# Patient Record
Sex: FEMALE | Race: WHITE | NOT HISPANIC OR LATINO | Employment: OTHER | URBAN - METROPOLITAN AREA
[De-identification: names, ages, dates, MRNs, and addresses within clinical notes are randomized per-mention and may not be internally consistent; named-entity substitution may affect disease eponyms.]

---

## 2017-01-04 ENCOUNTER — OFFICE VISIT (OUTPATIENT)
Dept: INFECTIOUS DISEASES | Facility: CLINIC | Age: 67
End: 2017-01-04
Payer: MEDICARE

## 2017-01-04 ENCOUNTER — HOSPITAL ENCOUNTER (OUTPATIENT)
Dept: RADIOLOGY | Facility: HOSPITAL | Age: 67
Discharge: HOME | End: 2017-01-04
Attending: INTERNAL MEDICINE
Payer: MEDICARE

## 2017-01-04 VITALS
HEART RATE: 76 BPM | SYSTOLIC BLOOD PRESSURE: 114 MMHG | TEMPERATURE: 99 F | DIASTOLIC BLOOD PRESSURE: 68 MMHG | BODY MASS INDEX: 21.51 KG/M2 | HEIGHT: 60 IN | WEIGHT: 109.56 LBS

## 2017-01-04 DIAGNOSIS — Z22.7 LATENT TUBERCULOSIS BY BLOOD TEST: ICD-10-CM

## 2017-01-04 DIAGNOSIS — Z22.7 LATENT TUBERCULOSIS BY BLOOD TEST: Primary | ICD-10-CM

## 2017-01-04 PROCEDURE — 99999 PR PBB SHADOW E&M-EST. PATIENT-LVL III: CPT | Mod: PBBFAC,,, | Performed by: INTERNAL MEDICINE

## 2017-01-04 PROCEDURE — 99203 OFFICE O/P NEW LOW 30 MIN: CPT | Mod: S$PBB,,, | Performed by: INTERNAL MEDICINE

## 2017-01-04 PROCEDURE — 71020 XR CHEST PA AND LATERAL: CPT | Mod: TC

## 2017-01-04 PROCEDURE — 71020 XR CHEST PA AND LATERAL: CPT | Mod: 26,,, | Performed by: RADIOLOGY

## 2017-01-04 RX ORDER — LANOLIN ALCOHOL/MO/W.PET/CERES
50 CREAM (GRAM) TOPICAL DAILY
Qty: 90 TABLET | Refills: 3 | Status: SHIPPED | OUTPATIENT
Start: 2017-01-04 | End: 2018-01-04

## 2017-01-04 RX ORDER — ISONIAZID 300 MG/1
300 TABLET ORAL DAILY
Qty: 90 TABLET | Refills: 3 | Status: SHIPPED | OUTPATIENT
Start: 2017-01-04 | End: 2017-12-26 | Stop reason: SDUPTHER

## 2017-01-04 NOTE — PROGRESS NOTES
Subjective:      Patient ID: Joceline Holcomb is a 66 y.o. female.    Chief Complaint:Positive Pablo. Gold test      History of Present Illness    Pt with RA sent by Dr. Barrett for recommendations. She has no hx of TB risk or exposure known. Found to have a positive quantiferon test on 12/21/16.   No cough, fever, night sweats or weight loss.   No recent CXR.    Review of Systems   Constitution: Positive for weakness, malaise/fatigue and weight loss. Negative for chills, decreased appetite, fever, night sweats and weight gain.   HENT: Positive for headaches and tinnitus. Negative for congestion, ear pain, hearing loss, hoarse voice and sore throat.    Eyes: Negative for blurred vision, redness and visual disturbance.   Cardiovascular: Negative for chest pain, leg swelling and palpitations.   Respiratory: Negative for cough, hemoptysis, shortness of breath and sputum production.    Hematologic/Lymphatic: Positive for adenopathy. Bruises/bleeds easily.   Skin: Negative for dry skin, itching, rash and suspicious lesions.   Musculoskeletal: Positive for joint pain, myalgias and neck pain. Negative for back pain.   Gastrointestinal: Positive for nausea and vomiting. Negative for abdominal pain, constipation, diarrhea and heartburn.   Genitourinary: Negative for dysuria, flank pain, frequency, hematuria, hesitancy and urgency.   Neurological: Positive for dizziness and numbness. Negative for paresthesias.   Psychiatric/Behavioral: Negative for depression and memory loss. The patient has insomnia. The patient is not nervous/anxious.      Objective:   Physical Exam   Constitutional: She is oriented to person, place, and time. She appears well-developed and well-nourished.   Neurological: She is alert and oriented to person, place, and time.   Skin: Skin is warm and dry.   Psychiatric: She has a normal mood and affect. Her behavior is normal. Thought content normal.   Vitals reviewed.    Assessment:       1. Latent  tuberculosis by blood test      2. Rheumatoid arthritis    Plan:        1. CXR; had recent liver profile which is normal   2.  mg daily and pyridoxine 50 mg daily for 12 mos   3. Monthly ALT to be monitored by her PCP on [facility]

## 2017-01-04 NOTE — LETTER
January 4, 2017      Rhonda Barrett MD  [address] [place] [address]           [facility]  [address]  [facility] [address]  Phone: [phone]  Fax: [phone]          Patient: Joceline Holcomb   MR Number: [id number]   YOB: 1950   Date of Visit: 1/4/2017       Dear Dr. Rhonda Barrett:    Thank you for referring Joceline Holcomb to me for evaluation. Attached you will find relevant portions of my assessment and plan of care.    If you have questions, please do not hesitate to call me. I look forward to following Joceline Holcomb along with you.    Sincerely,    Cristofer Bedoya MD    Enclosure  CC:  No Recipients    If you would like to receive this communication electronically, please contact [email or web address] or [phone] to request more information on theeventwall Link access.    For providers and/or their staff who would like to refer a patient to [facility], please contact us through our one-stop-shop provider referral line, [facility], at [phone].    If you feel you have received this communication in error or would no longer like to receive these types of communications, please e-mail [email or web address]

## 2017-01-09 ENCOUNTER — TELEPHONE (OUTPATIENT)
Dept: FAMILY MEDICINE | Facility: CLINIC | Age: 67
End: 2017-01-09

## 2017-01-09 DIAGNOSIS — G89.4 CHRONIC PAIN DISORDER: Primary | ICD-10-CM

## 2017-01-09 RX ORDER — HYDROCODONE BITARTRATE AND ACETAMINOPHEN 5; 325 MG/1; MG/1
1 TABLET ORAL EVERY 6 HOURS PRN
Qty: 120 TABLET | Refills: 0 | Status: SHIPPED | OUTPATIENT
Start: 2017-01-09 | End: 2017-02-08 | Stop reason: SDUPTHER

## 2017-01-09 NOTE — TELEPHONE ENCOUNTER
----- Message from Kay Gonsalez sent at 1/9/2017  8:35 AM CST -----  Contact: self: [phone]  Patient is calling concerning a refill on her pain medication Hydrocodone. Please send to:      [id number] [place] [facility] [address]  [address]  [facility] [address]  Phone: [phone] Fax: [phone]

## 2017-01-10 NOTE — TELEPHONE ENCOUNTER
Spoke with patient, notified her prescription was sent to pharmacy. Patient verbalized understanding.

## 2017-02-08 DIAGNOSIS — G89.4 CHRONIC PAIN DISORDER: ICD-10-CM

## 2017-02-08 NOTE — TELEPHONE ENCOUNTER
----- Message from Carrie Morales sent at 2/8/2017 12:22 PM CST -----  Contact: asa Weiners pain medication   Refilled   .  [facility] [place] - [address]  [address]  [place] [address]  Phone: [phone] Fax: [phone]     Call back

## 2017-02-10 ENCOUNTER — TELEPHONE (OUTPATIENT)
Dept: FAMILY MEDICINE | Facility: CLINIC | Age: 67
End: 2017-02-10

## 2017-02-10 RX ORDER — HYDROCODONE BITARTRATE AND ACETAMINOPHEN 5; 325 MG/1; MG/1
1 TABLET ORAL EVERY 6 HOURS PRN
Qty: 120 TABLET | Refills: 0 | Status: SHIPPED | OUTPATIENT
Start: 2017-02-10 | End: 2017-03-09 | Stop reason: SDUPTHER

## 2017-02-10 NOTE — TELEPHONE ENCOUNTER
----- Message from Chrissy Blanchard sent at 2/10/2017  1:40 PM CST -----  Contact: tai at [facility] [phone]  Please call tai at [facility] [phone] in regards to the norco prescription

## 2017-02-14 RX ORDER — FOLIC ACID 1 MG/1
TABLET ORAL
Qty: 90 TABLET | Refills: 0 | Status: SHIPPED | OUTPATIENT
Start: 2017-02-14 | End: 2017-04-03

## 2017-03-09 DIAGNOSIS — G89.4 CHRONIC PAIN DISORDER: ICD-10-CM

## 2017-03-09 RX ORDER — HYDROCODONE BITARTRATE AND ACETAMINOPHEN 5; 325 MG/1; MG/1
1 TABLET ORAL EVERY 6 HOURS PRN
Qty: 120 TABLET | Refills: 0 | Status: SHIPPED | OUTPATIENT
Start: 2017-03-09 | End: 2017-04-03 | Stop reason: SDUPTHER

## 2017-04-03 ENCOUNTER — OFFICE VISIT (OUTPATIENT)
Dept: RHEUMATOLOGY | Facility: CLINIC | Age: 67
End: 2017-04-03
Payer: MEDICARE

## 2017-04-03 ENCOUNTER — OFFICE VISIT (OUTPATIENT)
Dept: FAMILY MEDICINE | Facility: CLINIC | Age: 67
End: 2017-04-03
Payer: MEDICARE

## 2017-04-03 ENCOUNTER — DOCUMENTATION ONLY (OUTPATIENT)
Dept: FAMILY MEDICINE | Facility: CLINIC | Age: 67
End: 2017-04-03

## 2017-04-03 ENCOUNTER — LAB VISIT (OUTPATIENT)
Dept: LAB | Facility: HOSPITAL | Age: 67
End: 2017-04-03
Attending: INTERNAL MEDICINE
Payer: MEDICARE

## 2017-04-03 VITALS
TEMPERATURE: 98 F | DIASTOLIC BLOOD PRESSURE: 74 MMHG | SYSTOLIC BLOOD PRESSURE: 115 MMHG | WEIGHT: 100.31 LBS | HEIGHT: 60 IN | HEART RATE: 84 BPM | BODY MASS INDEX: 19.83 KG/M2 | HEIGHT: 60 IN | SYSTOLIC BLOOD PRESSURE: 117 MMHG | HEART RATE: 86 BPM | WEIGHT: 101 LBS | DIASTOLIC BLOOD PRESSURE: 75 MMHG | BODY MASS INDEX: 19.69 KG/M2

## 2017-04-03 DIAGNOSIS — M06.042 RHEUMATOID ARTHRITIS INVOLVING BOTH HANDS WITH NEGATIVE RHEUMATOID FACTOR: Primary | ICD-10-CM

## 2017-04-03 DIAGNOSIS — M06.041 RHEUMATOID ARTHRITIS INVOLVING BOTH HANDS WITH NEGATIVE RHEUMATOID FACTOR: ICD-10-CM

## 2017-04-03 DIAGNOSIS — M06.042 RHEUMATOID ARTHRITIS INVOLVING BOTH HANDS WITH NEGATIVE RHEUMATOID FACTOR: ICD-10-CM

## 2017-04-03 DIAGNOSIS — E87.6 HYPOKALEMIA: ICD-10-CM

## 2017-04-03 DIAGNOSIS — G89.4 CHRONIC PAIN DISORDER: ICD-10-CM

## 2017-04-03 DIAGNOSIS — R21 RASH: ICD-10-CM

## 2017-04-03 DIAGNOSIS — M06.041 RHEUMATOID ARTHRITIS INVOLVING BOTH HANDS WITH NEGATIVE RHEUMATOID FACTOR: Primary | ICD-10-CM

## 2017-04-03 DIAGNOSIS — Z79.899 LONG-TERM USE OF HYDROXYCHLOROQUINE: ICD-10-CM

## 2017-04-03 DIAGNOSIS — M81.0 OSTEOPOROSIS, UNSPECIFIED OSTEOPOROSIS TYPE, UNSPECIFIED PATHOLOGICAL FRACTURE PRESENCE: ICD-10-CM

## 2017-04-03 DIAGNOSIS — I10 HYPERTENSION, WELL CONTROLLED: Primary | ICD-10-CM

## 2017-04-03 LAB
ALBUMIN SERPL BCP-MCNC: 4 G/DL
ALP SERPL-CCNC: 73 U/L
ALT SERPL W/O P-5'-P-CCNC: 14 U/L
AMPHET+METHAMPHET UR QL: NEGATIVE
ANION GAP SERPL CALC-SCNC: 10 MMOL/L
AST SERPL-CCNC: 24 U/L
BARBITURATES UR QL SCN>200 NG/ML: NEGATIVE
BASOPHILS # BLD AUTO: 0 K/UL
BASOPHILS NFR BLD: 0.3 %
BENZODIAZ UR QL SCN>200 NG/ML: NEGATIVE
BILIRUB SERPL-MCNC: 0.6 MG/DL
BUN SERPL-MCNC: 12 MG/DL
BZE UR QL SCN: NEGATIVE
CALCIUM SERPL-MCNC: 9.7 MG/DL
CANNABINOIDS UR QL SCN: NORMAL
CHLORIDE SERPL-SCNC: 105 MMOL/L
CO2 SERPL-SCNC: 26 MMOL/L
CREAT SERPL-MCNC: 0.8 MG/DL
CREAT UR-MCNC: 32 MG/DL
CRP SERPL-MCNC: 4.6 MG/L
DIFFERENTIAL METHOD: ABNORMAL
EOSINOPHIL # BLD AUTO: 0 K/UL
EOSINOPHIL NFR BLD: 0.2 %
ERYTHROCYTE [DISTWIDTH] IN BLOOD BY AUTOMATED COUNT: 13.1 %
ERYTHROCYTE [SEDIMENTATION RATE] IN BLOOD BY WESTERGREN METHOD: 5 MM/HR
EST. GFR  (AFRICAN AMERICAN): >60 ML/MIN/1.73 M^2
EST. GFR  (NON AFRICAN AMERICAN): >60 ML/MIN/1.73 M^2
ETHANOL UR-MCNC: <10 MG/DL
GLUCOSE SERPL-MCNC: 87 MG/DL
HCT VFR BLD AUTO: 42.8 %
HGB BLD-MCNC: 14.2 G/DL
LYMPHOCYTES # BLD AUTO: 2 K/UL
LYMPHOCYTES NFR BLD: 25.6 %
MCH RBC QN AUTO: 29.8 PG
MCHC RBC AUTO-ENTMCNC: 33.1 %
MCV RBC AUTO: 90 FL
METHADONE UR QL SCN>300 NG/ML: NEGATIVE
MONOCYTES # BLD AUTO: 0.7 K/UL
MONOCYTES NFR BLD: 8.6 %
NEUTROPHILS # BLD AUTO: 5 K/UL
NEUTROPHILS NFR BLD: 65.3 %
OPIATES UR QL SCN: NORMAL
PCP UR QL SCN>25 NG/ML: NEGATIVE
PLATELET # BLD AUTO: 216 K/UL
PMV BLD AUTO: 9.1 FL
POTASSIUM SERPL-SCNC: 3 MMOL/L
PROT SERPL-MCNC: 6.6 G/DL
RBC # BLD AUTO: 4.75 M/UL
SODIUM SERPL-SCNC: 141 MMOL/L
TOXICOLOGY INFORMATION: NORMAL
WBC # BLD AUTO: 7.7 K/UL

## 2017-04-03 PROCEDURE — 86140 C-REACTIVE PROTEIN: CPT

## 2017-04-03 PROCEDURE — 80053 COMPREHEN METABOLIC PANEL: CPT

## 2017-04-03 PROCEDURE — 99214 OFFICE O/P EST MOD 30 MIN: CPT | Mod: S$PBB,,, | Performed by: INTERNAL MEDICINE

## 2017-04-03 PROCEDURE — 99999 PR PBB SHADOW E&M-EST. PATIENT-LVL III: CPT | Mod: PBBFAC,,, | Performed by: FAMILY MEDICINE

## 2017-04-03 PROCEDURE — 99214 OFFICE O/P EST MOD 30 MIN: CPT | Mod: S$PBB,,, | Performed by: FAMILY MEDICINE

## 2017-04-03 PROCEDURE — 85025 COMPLETE CBC W/AUTO DIFF WBC: CPT

## 2017-04-03 PROCEDURE — 99999 PR PBB SHADOW E&M-EST. PATIENT-LVL III: CPT | Mod: PBBFAC,,, | Performed by: INTERNAL MEDICINE

## 2017-04-03 PROCEDURE — 85651 RBC SED RATE NONAUTOMATED: CPT

## 2017-04-03 PROCEDURE — 36415 COLL VENOUS BLD VENIPUNCTURE: CPT

## 2017-04-03 PROCEDURE — 80307 DRUG TEST PRSMV CHEM ANLYZR: CPT

## 2017-04-03 PROCEDURE — 99213 OFFICE O/P EST LOW 20 MIN: CPT | Mod: PBBFAC,27,PO | Performed by: FAMILY MEDICINE

## 2017-04-03 RX ORDER — HYDROCODONE BITARTRATE AND ACETAMINOPHEN 5; 325 MG/1; MG/1
1 TABLET ORAL EVERY 6 HOURS PRN
Qty: 120 TABLET | Refills: 0 | Status: SHIPPED | OUTPATIENT
Start: 2017-04-03 | End: 2017-05-10 | Stop reason: SDUPTHER

## 2017-04-03 RX ORDER — POTASSIUM CHLORIDE 20 MEQ/1
20 TABLET, EXTENDED RELEASE ORAL 2 TIMES DAILY
Qty: 5 TABLET | Refills: 0 | Status: SHIPPED | OUTPATIENT
Start: 2017-04-03 | End: 2017-04-04 | Stop reason: SDUPTHER

## 2017-04-03 RX ORDER — METHOTREXATE 2.5 MG/1
10 TABLET ORAL
Qty: 20 TABLET | Refills: 2 | Status: SHIPPED | OUTPATIENT
Start: 2017-04-03 | End: 2017-07-13 | Stop reason: SDUPTHER

## 2017-04-03 ASSESSMENT — ROUTINE ASSESSMENT OF PATIENT INDEX DATA (RAPID3)
PSYCHOLOGICAL DISTRESS SCORE: 3.3
WHEN YOU AWAKENED IN THE MORNING OVER THE LAST WEEK, PLEASE INDICATE THE AMOUNT OF TIME IT TAKES UNTIL YOU ARE AS LIMBER AS YOU WILL BE FOR THE DAY: 10-15 MINUTES
AM STIFFNESS SCORE: 1, YES
FATIGUE SCORE: 5
TOTAL RAPID3 SCORE: 6.5
MDHAQ FUNCTION SCORE: 1.8
PAIN SCORE: 7.5
PATIENT GLOBAL ASSESSMENT SCORE: 6

## 2017-04-03 ASSESSMENT — DISEASE ACTIVITY SCORE (DAS28)
TOTAL_SCORE_ESR: 1.05
SWOLLEN_JOINTS_COUNT: 0
ESR_MM_PER_HR: 4
GLOBAL_HEALTH_SCORE: 6
TENDER_JOINTS_COUNT: 0

## 2017-04-03 NOTE — MR AVS SNAPSHOT
[facility]  [address]  [place] [address]  Phone: [phone]  Fax: [phone]                  Joceline Holcomb   4/3/2017 11:20 AM   Office Visit    Description:  Female : 1950   Provider:  Jacki Rivero MD   Department:  [place]           Reason for Visit     Follow-up           Diagnoses this Visit        Comments    Hypertension, well controlled    -  Primary     Chronic pain disorder                To Do List           Future Appointments        Provider Department Dept Phone    7/3/2017 8:00 AM [facility] [phone]    7/3/2017 10:00 AM Rhonda Barrett MD [place] [facility] Rheumatology [phone]    7/3/2017 1:00 PM Jacki Rivero MD [facility] [phone]      Goals (5 Years of Data)     None      Follow-Up and Disposition     Return in about 3 months (around 7/3/2017).       These Medications        Disp Refills Start End    hydrocodone-acetaminophen 5-325mg (NORCO) 5-325 mg per tablet 120 tablet 0 4/3/2017     Take 1 tablet by mouth every 6 (six) hours as needed for Pain. - Oral    Pharmacy: [facility] [address] [place] [address] [id number]  AT SEC of [facility] [address] Ph #: [phone]         [facility]     [facility] Nurse Care Line -  Assistance  Unless otherwise directed by your provider, please contact [facility], our nurse care line that is available for  assistance.     Registered nurses in the [facility] provide: appointment scheduling, clinical advisement, health education, and other advisory services.  Call: [phone] (toll free)               Medications           Message regarding Medications     Verify the changes and/or additions to your medication regime listed below are the same as discussed with your clinician today.  If any of these changes or additions are incorrect, please notify your healthcare provider.              Verify that the below list of medications is an accurate representation of the medications you are currently taking.  If none reported, the list may be blank. If incorrect, please contact your healthcare provider. Carry this list with you in case of emergency.           Current Medications     folic acid (FOLVITE) 1 MG tablet Take 1 tablet (1,000 mcg total) by mouth once daily.    hydrocodone-acetaminophen 5-325mg (NORCO) 5-325 mg per tablet Take 1 tablet by mouth every 6 (six) hours as needed for Pain.    hydroxychloroquine (PLAQUENIL) 200 mg tablet Take 1 tablet (200 mg total) by mouth 2 (two) times daily.    isoniazid (NYDRAZID) 300 MG Tab Take 1 tablet (300 mg total) by mouth once daily. One tablet daily for prevention of tuberculosis    losartan-hydrochlorothiazide 50-12.5 mg (HYZAAR) 50-12.5 mg per tablet Take 1 tablet by mouth once daily.    PROLIA 60 mg/mL Syrg     pyridoxine, vitamin B6, (VITAMIN B-6) 50 MG Tab Take 1 tablet (50 mg total) by mouth once daily.           Clinical Reference Information           Your Vitals Were     BP Pulse Temp Height Weight BMI    117/75 (BP Location: Right arm, Patient Position: Sitting, BP Method: Automatic) 86 97.9 °F (36.6 °C) (Oral) 5' (1.524 m) 45.5 kg (100 lb 5 oz) 19.59 kg/m2      Blood Pressure          Most Recent Value    BP  117/75      Allergies as of 4/3/2017     Adhesive      Immunizations Administered on Date of Encounter - 4/3/2017     None      Orders Placed During Today's Visit      Normal Orders This Visit    TOXICOLOGY SCREEN, URINE, RANDOM (COMPLIANCE)       Language Assistance Services     ATTENTION: Language assistance services are available, free of charge. Please call [phone].      ATENCIÓN: Si habla español, tiene a davis disposición servicios gratuitos de asistencia lingüística. Llame al [phone].     BILL Ý: N?u b?n nói Ti?ng Vi?t, có các d?ch v? h? tr? ngôn ng? mi?n phí dành cho b?n. G?i s? [phone].         [facility] complies with applicable Federal civil rights laws and does not discriminate on the basis of race, color, national origin, age, disability, or sex.

## 2017-04-03 NOTE — PROGRESS NOTES
Subjective:       Patient ID: Joceline Holcomb is a 66 y.o. female.    Chief Complaint: Seronegative Rheumatoid arthritis  HPI 66-year-old female is here for follow up for seronegative rheumatoid arthritis.   Currently on Plaquenil 200 mg twice daily.  Methotrexate on hold secondary to positive QuantiFERON gold test. In the past she has taken sulfasalazine (not effective) as well as prednisone.    She has been evaluated by infectious disease-started on INH and pyridoxine for latent TB.  Tolerating medications without any side effects.  Though, she does have scaly rash in bilateral palms    Denies any new joint pain or swelling.   She had a fall in Nov 2016- left shoulder injury ?fracture- managed by Dr Johanson.  Last DEXA was in September 2016-stable per patient.  Continues to be on Prolia  She denies fever, weight loss, dry eyes or mouth, photosensitivity, ulcer, raynaud's phenomenon, alopecia, dysphagia, diarrhea or blood in the stools    Review of Systems   Constitutional: Negative for fatigue and fever.   HENT: Negative for facial swelling.    Eyes: Negative for pain and redness.   Respiratory: Negative for cough and shortness of breath.    Cardiovascular: Negative for chest pain and leg swelling.   Gastrointestinal: Negative for abdominal distention and abdominal pain.   Musculoskeletal: Negative for arthralgias and gait problem.   Neurological: Negative for tremors and seizures.   Psychiatric/Behavioral: Negative for agitation and behavioral problems.         Objective:     /74  Pulse 84  Ht 5' (1.524 m)  Wt 45.8 kg (100 lb 15.5 oz)  BMI 19.72 kg/m2     Physical Exam   Constitutional: She is oriented to person, place, and time and well-developed, well-nourished, and in no distress.   HENT:   Head: Normocephalic and atraumatic.   Eyes: Conjunctivae are normal. Pupils are equal, round, and reactive to light.   Neck: Neck supple.   Cardiovascular: Normal rate and regular rhythm.    Pulmonary/Chest:  Effort normal and breath sounds normal.   Abdominal: Soft. Bowel sounds are normal.       Right Side Rheumatological Exam     Examination finds the shoulder, elbow, wrist, 1st PIP, 1st MCP, 2nd PIP, 2nd MCP, 3rd PIP, 3rd MCP, 4th PIP, 4th MCP, 5th PIP and 5th MCP normal.    The patient has an enlarged knee    Left Side Rheumatological Exam     Examination finds the shoulder, elbow, wrist, 1st PIP, 1st MCP, 2nd PIP, 2nd MCP, 3rd PIP, 3rd MCP, 4th PIP, 4th MCP, 5th PIP and 5th MCP normal.    The patient has an enlarged knee.      Neurological: She is alert and oriented to person, place, and time.   Skin: Skin is warm and dry. Rash noted.     + Scaly rash on both palms   Psychiatric: Mood and affect normal.   Musculoskeletal:    No joint swelling                 Lab Results   Component Value Date    WBC 7.70 04/03/2017    HGB 14.2 04/03/2017    HCT 42.8 04/03/2017    MCV 90 04/03/2017     04/03/2017     CMP  Sodium   Date Value Ref Range Status   12/21/2016 142 136 - 145 mmol/L Final     Potassium   Date Value Ref Range Status   12/21/2016 3.7 3.5 - 5.1 mmol/L Final     Chloride   Date Value Ref Range Status   12/21/2016 105 95 - 110 mmol/L Final     CO2   Date Value Ref Range Status   12/21/2016 29 23 - 29 mmol/L Final     Glucose   Date Value Ref Range Status   12/21/2016 89 70 - 110 mg/dL Final     BUN, Bld   Date Value Ref Range Status   12/21/2016 14 8 - 23 mg/dL Final     Creatinine   Date Value Ref Range Status   12/21/2016 0.8 0.5 - 1.4 mg/dL Final     Calcium   Date Value Ref Range Status   12/21/2016 9.2 8.7 - 10.5 mg/dL Final     Total Protein   Date Value Ref Range Status   12/21/2016 6.0 6.0 - 8.4 g/dL Final     Albumin   Date Value Ref Range Status   12/21/2016 3.8 3.5 - 5.2 g/dL Final     Total Bilirubin   Date Value Ref Range Status   12/21/2016 0.4 0.1 - 1.0 mg/dL Final     Comment:     For infants and newborns, interpretation of results should be based  on gestational age, weight and in  agreement with clinical  observations.  Premature Infant recommended reference ranges:  Up to 24 hours.............<8.0 mg/dL  Up to 48 hours............<12.0 mg/dL  3-5 days..................<15.0 mg/dL  6-29 days.................<15.0 mg/dL       Alkaline Phosphatase   Date Value Ref Range Status   12/21/2016 68 55 - 135 U/L Final     AST   Date Value Ref Range Status   12/21/2016 16 10 - 40 U/L Final     ALT   Date Value Ref Range Status   12/21/2016 8 (L) 10 - 44 U/L Final     Anion Gap   Date Value Ref Range Status   12/21/2016 8 8 - 16 mmol/L Final     eGFR if    Date Value Ref Range Status   12/21/2016 >60 >60 mL/min/1.73 m^2 Final     eGFR if non    Date Value Ref Range Status   12/21/2016 >60 >60 mL/min/1.73 m^2 Final     Comment:     Calculation used to obtain the estimated glomerular filtration  rate (eGFR) is the CKD-EPI equation. Since race is unknown   in our information system, the eGFR values for   -American and Non--American patients are given   for each creatinine result.       Lab Results   Component Value Date    SEDRATE 4 12/21/2016     Lab Results   Component Value Date    CRP 1.9 12/21/2016       Assessment:   Seronegative  rheumatoid arthritis DAS28 1.05   Latent TB-on INH and pyridoxine for more than 2 months  Scaly rash on palms-?  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 Last DEXA in Sep 2016- stable. Managed by Dr Johanson   Osteoarthritis of bilateral knees  Long-term use of methotrexate  Long term use of hydroxychloroquine- last eye exam was on 6/6/16   Plan:   Hold Plaquenil  Start K-Dur 20 milliequivalent a day for 5 days  Restart methotrexate 10 mg per week  Continue folic acid 1 mg a day  Continue Prolia   Continue INH and Pyridoxine  RTC in 3 months with labs

## 2017-04-03 NOTE — PROGRESS NOTES
04/03/17 1008   OTHER   MDHAQ Score 1.8   Psychologic Score 3.3   Pain Score 7.5   Morning Stiffness Score Yes   Number of minutes or hours until limber 10-15 minutes   Fatigue Score 5   Global Health Score 6   RAPID3 Score 6.5

## 2017-04-03 NOTE — PROGRESS NOTES
Pre-Visit Chart Review  For Appointment Scheduled on 4-3-17    Health Maintenance Due   Topic Date Due    TETANUS VACCINE  09[birth year]1966

## 2017-04-03 NOTE — MR AVS SNAPSHOT
[place] [facility]  [address]  [place] [address]  Phone: [phone]  Fax: [phone]                  Joceline Holcomb   4/3/2017 10:30 AM   Office Visit    Description:  Female : 1950   Provider:  Rhonda Barrett MD   Department:  [place] [facility]           Reason for Visit     Follow-up           Diagnoses this Visit        Comments    Rheumatoid arthritis involving both hands with negative rheumatoid factor    -  Primary            To Do List           Future Appointments        Provider Department Dept Phone    4/3/2017 11:20 AM Jacki Rivero MD [place] [facility] [phone]    7/3/2017 8:00 AM [facility] [phone]    7/3/2017 10:00 AM Rhonda Barrett MD [place] [facility] [phone]      Goals (5 Years of Data)     None      [facility] On Call     [facility] On Call Nurse Care Line -  Assistance  Unless otherwise directed by your provider, please contact [facility], our nurse care line that is available for  assistance.     Registered nurses in the [facility] provide: appointment scheduling, clinical advisement, health education, and other advisory services.  Call: [phone] (toll free)               Medications           Message regarding Medications     Verify the changes and/or additions to your medication regime listed below are the same as discussed with your clinician today.  If any of these changes or additions are incorrect, please notify your healthcare provider.             Verify that the below list of medications is an accurate representation of the medications you are currently taking.  If none reported, the list may be blank. If incorrect, please contact your healthcare provider. Carry this list with you in case of emergency.           Current Medications     folic acid (FOLVITE) 1 MG tablet Take 1 tablet (1,000 mcg total) by mouth once daily.    folic acid (FOLVITE) 1 MG  tablet TAKE 1 TABLET BY MOUTH EVERY DAY.    hydrocodone-acetaminophen 5-325mg (NORCO) 5-325 mg per tablet Take 1 tablet by mouth every 6 (six) hours as needed for Pain.    hydroxychloroquine (PLAQUENIL) 200 mg tablet Take 1 tablet (200 mg total) by mouth 2 (two) times daily.    isoniazid (NYDRAZID) 300 MG Tab Take 1 tablet (300 mg total) by mouth once daily. One tablet daily for prevention of tuberculosis    losartan-hydrochlorothiazide 50-12.5 mg (HYZAAR) 50-12.5 mg per tablet Take 1 tablet by mouth once daily.    PROLIA 60 mg/mL Syrg     pyridoxine, vitamin B6, (VITAMIN B-6) 50 MG Tab Take 1 tablet (50 mg total) by mouth once daily.           Clinical Reference Information           Your Vitals Were     BP Pulse Height Weight BMI    115/74 84 5' (1.524 m) 45.8 kg (100 lb 15.5 oz) 19.72 kg/m2      Blood Pressure          Most Recent Value    BP  115/74      Allergies as of 4/3/2017     Adhesive      Immunizations Administered on Date of Encounter - 4/3/2017     None      Orders Placed During Today's Visit     Future Labs/Procedures Expected by Expires    C-reactive protein  4/3/2017 6/2/2018    C-reactive protein  4/3/2017 6/2/2018    CBC auto differential  4/3/2017 6/2/2018    CBC auto differential  4/3/2017 6/2/2018    Comprehensive metabolic panel  4/3/2017 6/2/2018    Comprehensive metabolic panel  4/3/2017 6/2/2018    Sedimentation rate, manual  4/3/2017 6/2/2018    Sedimentation rate, manual  4/3/2017 6/2/2018      [facility] Sign-Up     Activating your [facility] account is as easy as 1-2-3!     1) Visit [email or web address], select Sign Up Now, enter this activation code and your date of birth, then select Next.  [email or web address]  Expires: 5/18/2017 10:34 AM      2) Create a username and password to use when you visit [facility] in the future and select a security question in case you lose your password and select Next.    3) Enter your e-mail address and click Sign Up!    Additional Information  If you have  questions, please e-mail [email or web address] or call [phone] to talk to our [facility] staff. Remember, [facility] is NOT to be used for urgent needs. For medical emergencies, dial 911.         Language Assistance Services     ATTENTION: Language assistance services are available, free of charge. Please call [phone].      ATENCIÓN: Si habla español, tiene a davis disposición servicios gratuitos de asistencia lingüística. Llame al [phone].     [facility] Ý: N?u b?n nói Ti?ng Vi?t, có các d?ch v? h? tr? ngôn ng? mi?n phí dành cho b?n. G?i s? [phone].         [facility] [facility] complies with applicable Federal civil rights laws and does not discriminate on the basis of race, color, national origin, age, disability, or sex.

## 2017-04-03 NOTE — PROGRESS NOTES
Subjective:       Patient ID: Joceline Holcomb is a 66 y.o. female.    Chief Complaint: Follow-up    Patient Active Problem List   Diagnosis    Rheumatoid arthritis involving both hands with negative rheumatoid factor    Long-term use of hydroxychloroquine    Osteoporosis    Hypertension, well controlled    Long term methotrexate user    Vitamin D deficiency    Marijuana use    Body mass index (BMI) less than or equal to 19 in adult   chronic pain- stable on norco qid.      HPI  Review of Systems   Constitutional: Negative for fatigue and unexpected weight change.   Respiratory: Negative for chest tightness and shortness of breath.    Cardiovascular: Negative for chest pain, palpitations and leg swelling.   Gastrointestinal: Negative for abdominal pain.   Musculoskeletal: Negative for arthralgias.   Neurological: Negative for dizziness, syncope, light-headedness and headaches.       Objective:      Physical Exam   Constitutional: She is oriented to person, place, and time. She appears well-developed and well-nourished.   Cardiovascular: Normal rate, regular rhythm and normal heart sounds.    Pulmonary/Chest: Effort normal and breath sounds normal.   Musculoskeletal: She exhibits no edema.   Neurological: She is alert and oriented to person, place, and time.   Skin: Skin is warm and dry.   Psychiatric: She has a normal mood and affect.   Nursing note and vitals reviewed.      Assessment:       1. Hypertension, well controlled    2. Chronic pain disorder    3. Body mass index (BMI) less than or equal to 19 in adult    4. Rheumatoid arthritis involving both hands with negative rheumatoid factor        Plan:       1. Chronic pain disorder  Controlled on current medications.  Continue current medications.    - hydrocodone-acetaminophen 5-325mg (NORCO) 5-325 mg per tablet; Take 1 tablet by mouth every 6 (six) hours as needed for Pain.  Dispense: 120 tablet; Refill: 0  - TOXICOLOGY SCREEN, URINE, RANDOM  (COMPLIANCE)    2. Hypertension, well controlled  Controlled on current medications.  Continue current medications.      3. Body mass index (BMI) less than or equal to 19 in adult  Patient encouraged to continue nutritional supplementation.  May need nutrition referral if weight loss persists .  Reweigh in 3 months    4. Rheumatoid arthritis involving both hands with negative rheumatoid factor  Cont care and meds under rheum    [facility] goal documentation:  Patient readiness: acceptance and barriers:readiness  During the course of the visit the patient was educated and counseled about the following: Hypertension:   Dietary sodium restriction.  Regular aerobic exercise.  Underweight:  Nutritional supplements and Follow up and re-weight in: 3  months and as needed.   Goals: Hypertension: Reduce Blood Pressure and Underweight: Increase calorie intake and BMI    Goal/Outcomes met:Hypertension  The following self management tools provided:none  Patient Instructions (the written plan) was given to the patient/family: Yes  Time spent with patient: 20 minutes    Patient with be reevaluated in 3 months or sooner prn    Greater than 50% of this visit was spent counseling as described in above documentation:Yes

## 2017-04-04 ENCOUNTER — TELEPHONE (OUTPATIENT)
Dept: RHEUMATOLOGY | Facility: CLINIC | Age: 67
End: 2017-04-04

## 2017-04-04 DIAGNOSIS — M06.042 RHEUMATOID ARTHRITIS INVOLVING BOTH HANDS WITH NEGATIVE RHEUMATOID FACTOR: Primary | ICD-10-CM

## 2017-04-04 DIAGNOSIS — M06.041 RHEUMATOID ARTHRITIS INVOLVING BOTH HANDS WITH NEGATIVE RHEUMATOID FACTOR: Primary | ICD-10-CM

## 2017-04-04 DIAGNOSIS — E87.6 HYPOKALEMIA: ICD-10-CM

## 2017-04-04 RX ORDER — POTASSIUM CHLORIDE 20 MEQ/1
TABLET, EXTENDED RELEASE ORAL
Qty: 5 TABLET | Refills: 0 | Status: SHIPPED | OUTPATIENT
Start: 2017-04-04 | End: 2017-04-05 | Stop reason: SDUPTHER

## 2017-04-05 DIAGNOSIS — E87.6 HYPOKALEMIA: ICD-10-CM

## 2017-04-05 RX ORDER — POTASSIUM CHLORIDE 20 MEQ/1
TABLET, EXTENDED RELEASE ORAL
Qty: 5 TABLET | Refills: 0 | Status: SHIPPED | OUTPATIENT
Start: 2017-04-05 | End: 2017-04-06 | Stop reason: SDUPTHER

## 2017-04-05 NOTE — TELEPHONE ENCOUNTER
Spoke to pt, advised of labs and scheduled for 6 weeks MTX monitoring. Pt also had questions about her potassium tablets that were prescribed and said they were to large to swallow. Advised to break tablets in half and try them that way, also instructed that she only needs to take for 5 days. Pt verbalized understanding.

## 2017-04-06 DIAGNOSIS — E87.6 HYPOKALEMIA: ICD-10-CM

## 2017-04-06 RX ORDER — POTASSIUM CHLORIDE 20 MEQ/1
20 TABLET, EXTENDED RELEASE ORAL DAILY
Qty: 5 TABLET | Refills: 0 | Status: SHIPPED | OUTPATIENT
Start: 2017-04-06 | End: 2017-07-03

## 2017-04-06 RX ORDER — POTASSIUM CHLORIDE 20 MEQ/1
TABLET, EXTENDED RELEASE ORAL
Qty: 90 TABLET | Refills: 0 | OUTPATIENT
Start: 2017-04-06

## 2017-04-15 DIAGNOSIS — M06.042 RHEUMATOID ARTHRITIS INVOLVING BOTH HANDS WITH NEGATIVE RHEUMATOID FACTOR: ICD-10-CM

## 2017-04-15 DIAGNOSIS — M06.041 RHEUMATOID ARTHRITIS INVOLVING BOTH HANDS WITH NEGATIVE RHEUMATOID FACTOR: ICD-10-CM

## 2017-04-16 RX ORDER — HYDROXYCHLOROQUINE SULFATE 200 MG/1
TABLET, FILM COATED ORAL
Qty: 60 TABLET | Refills: 0 | OUTPATIENT
Start: 2017-04-16

## 2017-05-10 DIAGNOSIS — G89.4 CHRONIC PAIN DISORDER: ICD-10-CM

## 2017-05-10 NOTE — TELEPHONE ENCOUNTER
----- Message from Carrie Morales sent at 5/10/2017  9:13 AM CDT -----  Contact: asa   Pain medication refill   .  [facility] - [place] - [address] AT [address]  [address]  [place] [address]  Phone: [phone] Fax: [phone]     Call back

## 2017-05-11 RX ORDER — HYDROCODONE BITARTRATE AND ACETAMINOPHEN 5; 325 MG/1; MG/1
1 TABLET ORAL EVERY 6 HOURS PRN
Qty: 120 TABLET | Refills: 0 | Status: SHIPPED | OUTPATIENT
Start: 2017-05-11 | End: 2017-06-12 | Stop reason: SDUPTHER

## 2017-05-15 ENCOUNTER — LAB VISIT (OUTPATIENT)
Dept: LAB | Facility: HOSPITAL | Age: 67
End: 2017-05-15
Attending: INTERNAL MEDICINE
Payer: MEDICARE

## 2017-05-15 DIAGNOSIS — M06.041 RHEUMATOID ARTHRITIS INVOLVING BOTH HANDS WITH NEGATIVE RHEUMATOID FACTOR: ICD-10-CM

## 2017-05-15 DIAGNOSIS — M06.042 RHEUMATOID ARTHRITIS INVOLVING BOTH HANDS WITH NEGATIVE RHEUMATOID FACTOR: ICD-10-CM

## 2017-05-15 LAB
ALBUMIN SERPL BCP-MCNC: 3.7 G/DL
ALP SERPL-CCNC: 71 U/L
ALT SERPL W/O P-5'-P-CCNC: 14 U/L
ANION GAP SERPL CALC-SCNC: 9 MMOL/L
AST SERPL-CCNC: 22 U/L
BASOPHILS # BLD AUTO: 0 K/UL
BASOPHILS NFR BLD: 0.4 %
BILIRUB SERPL-MCNC: 0.5 MG/DL
BUN SERPL-MCNC: 14 MG/DL
CALCIUM SERPL-MCNC: 9.8 MG/DL
CHLORIDE SERPL-SCNC: 106 MMOL/L
CO2 SERPL-SCNC: 28 MMOL/L
CREAT SERPL-MCNC: 0.8 MG/DL
CRP SERPL-MCNC: 5.9 MG/L
DIFFERENTIAL METHOD: ABNORMAL
EOSINOPHIL # BLD AUTO: 0 K/UL
EOSINOPHIL NFR BLD: 0.3 %
ERYTHROCYTE [DISTWIDTH] IN BLOOD BY AUTOMATED COUNT: 13.5 %
ERYTHROCYTE [SEDIMENTATION RATE] IN BLOOD BY WESTERGREN METHOD: 11 MM/HR
EST. GFR  (AFRICAN AMERICAN): >60 ML/MIN/1.73 M^2
EST. GFR  (NON AFRICAN AMERICAN): >60 ML/MIN/1.73 M^2
GLUCOSE SERPL-MCNC: 85 MG/DL
HCT VFR BLD AUTO: 38.8 %
HGB BLD-MCNC: 12.9 G/DL
LYMPHOCYTES # BLD AUTO: 1.5 K/UL
LYMPHOCYTES NFR BLD: 20.3 %
MCH RBC QN AUTO: 30.2 PG
MCHC RBC AUTO-ENTMCNC: 33.1 %
MCV RBC AUTO: 91 FL
MONOCYTES # BLD AUTO: 0.4 K/UL
MONOCYTES NFR BLD: 5.7 %
NEUTROPHILS # BLD AUTO: 5.3 K/UL
NEUTROPHILS NFR BLD: 73.3 %
PLATELET # BLD AUTO: 231 K/UL
PMV BLD AUTO: 8.2 FL
POTASSIUM SERPL-SCNC: 4.2 MMOL/L
PROT SERPL-MCNC: 6.4 G/DL
RBC # BLD AUTO: 4.26 M/UL
SODIUM SERPL-SCNC: 143 MMOL/L
WBC # BLD AUTO: 7.2 K/UL

## 2017-05-15 PROCEDURE — 86140 C-REACTIVE PROTEIN: CPT

## 2017-05-15 PROCEDURE — 36415 COLL VENOUS BLD VENIPUNCTURE: CPT

## 2017-05-15 PROCEDURE — 85025 COMPLETE CBC W/AUTO DIFF WBC: CPT

## 2017-05-15 PROCEDURE — 80053 COMPREHEN METABOLIC PANEL: CPT

## 2017-05-15 PROCEDURE — 85651 RBC SED RATE NONAUTOMATED: CPT

## 2017-06-04 DIAGNOSIS — M06.042 RHEUMATOID ARTHRITIS INVOLVING BOTH HANDS WITH NEGATIVE RHEUMATOID FACTOR: ICD-10-CM

## 2017-06-04 DIAGNOSIS — M06.041 RHEUMATOID ARTHRITIS INVOLVING BOTH HANDS WITH NEGATIVE RHEUMATOID FACTOR: ICD-10-CM

## 2017-06-04 RX ORDER — HYDROXYCHLOROQUINE SULFATE 200 MG/1
TABLET, FILM COATED ORAL
Qty: 60 TABLET | Refills: 0 | OUTPATIENT
Start: 2017-06-04

## 2017-06-07 ENCOUNTER — TELEPHONE (OUTPATIENT)
Dept: RHEUMATOLOGY | Facility: CLINIC | Age: 67
End: 2017-06-07

## 2017-06-07 NOTE — TELEPHONE ENCOUNTER
Pt was told to hold Plaquenil on 4/3/17 in her office visit. Pt states she has been continuing to take the Plaquenil. Pharmacy has given her a 3 day supply of medication. Pt wants to know if she is supposed to be taking this medication? If so, pt will need a refill sent to her pharmacy.

## 2017-06-07 NOTE — TELEPHONE ENCOUNTER
----- Message from Alma Painter sent at 6/7/2017  1:43 PM CDT -----  Contact: Joceline  Patient requesting refill Rx Plaquenil as     [id number] - [place], [place] - [address]  [address]  [place] [address]  Phone: [phone] Fax: [phone]    Out of medication and pharmacy gave three day supply. Also states pharmacy has been faxing over refill request. Please call when sent to pharmacy [phone]. Thanks!

## 2017-06-07 NOTE — TELEPHONE ENCOUNTER
Called patient.  She reports that Plaquenil is causing the rash and palms.  I again advised her to DC Plaquenil.

## 2017-06-12 DIAGNOSIS — G89.4 CHRONIC PAIN DISORDER: ICD-10-CM

## 2017-06-12 NOTE — TELEPHONE ENCOUNTER
----- Message from Brennon Castañeda sent at 6/10/2017  9:39 AM CDT -----  Contact: pt  Pt is requesting a refill on her Hydrocodone 5/325mg  Call Back#[phone]  Thanks    [facility] - [place] - [address]  [address]  [place] [address]  Phone: [phone] Fax: [phone]

## 2017-06-13 NOTE — TELEPHONE ENCOUNTER
----- Message from Alma Del Cid sent at 6/13/2017 11:28 AM CDT -----  Contact: self  Patient [phone] had called this past Saturday 06 10 17 for a refill on Hydrocodone but has not received any response/she is asking if the message was received?/please advise

## 2017-06-14 RX ORDER — HYDROCODONE BITARTRATE AND ACETAMINOPHEN 5; 325 MG/1; MG/1
1 TABLET ORAL EVERY 6 HOURS PRN
Qty: 120 TABLET | Refills: 0 | Status: SHIPPED | OUTPATIENT
Start: 2017-06-14 | End: 2017-07-03 | Stop reason: SDUPTHER

## 2017-06-27 ENCOUNTER — DOCUMENTATION ONLY (OUTPATIENT)
Dept: FAMILY MEDICINE | Facility: CLINIC | Age: 67
End: 2017-06-27

## 2017-06-27 NOTE — PROGRESS NOTES
Pre-Visit Chart Review  For Appointment Scheduled on 07/03/2017    Health Maintenance Due   Topic Date Due    TETANUS VACCINE  09/12/1968

## 2017-06-27 NOTE — PATIENT INSTRUCTIONS
Controlling High Blood Pressure  High blood pressure (hypertension) is often called the silent killer. This is because many people who have it dont know it. High blood pressure is defined as 140/90 mm Hg or higher. Know your blood pressure and remember to check it regularly. Doing so can save your life. Here are some things you can do to help control your blood pressure.    Choose heart-healthy foods  · Select low-salt, low-fat foods. Limit sodium intake to 2,400 mg per day or the amount suggested by your healthcare provider.  · Limit canned, dried, cured, packaged, and fast foods. These can contain a lot of salt.  · Eat 8 to 10 servings of fruits and vegetables every day.  · Choose lean meats, fish, or chicken.  · Eat whole-grain pasta, brown rice, and beans.  · Eat 2 to 3 servings of low-fat or fat-free dairy products.  · Ask your doctor about the DASH eating plan. This plan helps reduce blood pressure.  · When you go to a restaurant, ask that your meal be prepared with no added salt.  Maintain a healthy weight  · Ask your healthcare provider how many calories to eat a day. Then stick to that number.  · Ask your healthcare provider what weight range is healthiest for you. If you are overweight, a weight loss of only 3% to 5% of your body weight can help lower blood pressure. Generally, a good weight loss goal is to lose 10% of your body weight in a year.  · Limit snacks and sweets.  · Get regular exercise.  Get up and get active  · Choose activities you enjoy. Find ones you can do with friends or family. This includes bicycling, dancing, walking, and jogging.  · Park farther away from building entrances.  · Use stairs instead of the elevator.  · When you can, walk or bike instead of driving.  · [place] leaves, garden, or do household repairs.  · Be active at a moderate to vigorous level of physical activity for at least 40 minutes for a minimum of 3 to 4 days a week.   Manage stress  · Make time to relax and enjoy  life. Find time to laugh.  · Communicate your concerns with your loved ones and your healthcare provider.  · Visit with family and friends, and keep up with hobbies.  Limit alcohol and quit smoking  · Men should have no more than 2 drinks per day.  · Women should have no more than 1 drink per day.  · Talk with your healthcare provider about quitting smoking. Smoking significantly increases your risk for heart disease and stroke. Ask your healthcare provider about community smoking cessation programs and other options.  Medicines  If lifestyle changes arent enough, your healthcare provider may prescribe high blood pressure medicine. Take all medicines as prescribed. If you have any questions about your medicines, ask your healthcare provider before stopping or changing them.   Date Last Reviewed: 4/27/2016  © 1775-7800 [employer]. [address] [place] [address], [place] [address]. All rights reserved. This information is not intended as a substitute for professional medical care. Always follow your healthcare professional's instructions.

## 2017-06-30 ENCOUNTER — TELEPHONE (OUTPATIENT)
Dept: RHEUMATOLOGY | Facility: CLINIC | Age: 67
End: 2017-06-30

## 2017-07-03 ENCOUNTER — OFFICE VISIT (OUTPATIENT)
Dept: FAMILY MEDICINE | Facility: CLINIC | Age: 67
End: 2017-07-03
Payer: MEDICARE

## 2017-07-03 ENCOUNTER — OFFICE VISIT (OUTPATIENT)
Dept: RHEUMATOLOGY | Facility: CLINIC | Age: 67
End: 2017-07-03
Payer: MEDICARE

## 2017-07-03 ENCOUNTER — LAB VISIT (OUTPATIENT)
Dept: LAB | Facility: HOSPITAL | Age: 67
End: 2017-07-03
Attending: INTERNAL MEDICINE
Payer: MEDICARE

## 2017-07-03 VITALS
WEIGHT: 107.13 LBS | SYSTOLIC BLOOD PRESSURE: 89 MMHG | TEMPERATURE: 98 F | HEIGHT: 60 IN | BODY MASS INDEX: 21.03 KG/M2 | DIASTOLIC BLOOD PRESSURE: 60 MMHG | HEART RATE: 79 BPM

## 2017-07-03 VITALS
HEIGHT: 60 IN | WEIGHT: 106.25 LBS | BODY MASS INDEX: 20.86 KG/M2 | SYSTOLIC BLOOD PRESSURE: 111 MMHG | DIASTOLIC BLOOD PRESSURE: 72 MMHG | HEART RATE: 57 BPM

## 2017-07-03 DIAGNOSIS — M06.041 RHEUMATOID ARTHRITIS INVOLVING BOTH HANDS WITH NEGATIVE RHEUMATOID FACTOR: Primary | ICD-10-CM

## 2017-07-03 DIAGNOSIS — M06.041 RHEUMATOID ARTHRITIS INVOLVING BOTH HANDS WITH NEGATIVE RHEUMATOID FACTOR: ICD-10-CM

## 2017-07-03 DIAGNOSIS — M06.042 RHEUMATOID ARTHRITIS INVOLVING BOTH HANDS WITH NEGATIVE RHEUMATOID FACTOR: ICD-10-CM

## 2017-07-03 DIAGNOSIS — E55.9 VITAMIN D DEFICIENCY: ICD-10-CM

## 2017-07-03 DIAGNOSIS — G89.29 OTHER CHRONIC PAIN: Primary | ICD-10-CM

## 2017-07-03 DIAGNOSIS — I10 HYPERTENSION, WELL CONTROLLED: ICD-10-CM

## 2017-07-03 DIAGNOSIS — Z79.631 LONG TERM METHOTREXATE USER: ICD-10-CM

## 2017-07-03 DIAGNOSIS — M81.0 AGE-RELATED OSTEOPOROSIS WITHOUT CURRENT PATHOLOGICAL FRACTURE: ICD-10-CM

## 2017-07-03 DIAGNOSIS — G89.4 CHRONIC PAIN DISORDER: ICD-10-CM

## 2017-07-03 DIAGNOSIS — F12.90 MARIJUANA USE: ICD-10-CM

## 2017-07-03 DIAGNOSIS — M06.042 RHEUMATOID ARTHRITIS INVOLVING BOTH HANDS WITH NEGATIVE RHEUMATOID FACTOR: Primary | ICD-10-CM

## 2017-07-03 LAB
ALBUMIN SERPL BCP-MCNC: 3.7 G/DL
ALP SERPL-CCNC: 87 U/L
ALT SERPL W/O P-5'-P-CCNC: 20 U/L
AMPHET+METHAMPHET UR QL: NEGATIVE
ANION GAP SERPL CALC-SCNC: 11 MMOL/L
AST SERPL-CCNC: 22 U/L
BARBITURATES UR QL SCN>200 NG/ML: NEGATIVE
BASOPHILS # BLD AUTO: 0 K/UL
BASOPHILS NFR BLD: 0.7 %
BENZODIAZ UR QL SCN>200 NG/ML: NEGATIVE
BILIRUB SERPL-MCNC: 0.5 MG/DL
BUN SERPL-MCNC: 14 MG/DL
BZE UR QL SCN: NEGATIVE
CALCIUM SERPL-MCNC: 9.6 MG/DL
CANNABINOIDS UR QL SCN: NEGATIVE
CHLORIDE SERPL-SCNC: 104 MMOL/L
CO2 SERPL-SCNC: 27 MMOL/L
CREAT SERPL-MCNC: 0.9 MG/DL
CREAT UR-MCNC: 45 MG/DL
CRP SERPL-MCNC: 3.4 MG/L
DIFFERENTIAL METHOD: ABNORMAL
EOSINOPHIL # BLD AUTO: 0 K/UL
EOSINOPHIL NFR BLD: 0.8 %
ERYTHROCYTE [DISTWIDTH] IN BLOOD BY AUTOMATED COUNT: 14.8 %
ERYTHROCYTE [SEDIMENTATION RATE] IN BLOOD BY WESTERGREN METHOD: 8 MM/HR
EST. GFR  (AFRICAN AMERICAN): >60 ML/MIN/1.73 M^2
EST. GFR  (NON AFRICAN AMERICAN): >60 ML/MIN/1.73 M^2
ETHANOL UR-MCNC: <10 MG/DL
GLUCOSE SERPL-MCNC: 100 MG/DL
HCT VFR BLD AUTO: 40.1 %
HGB BLD-MCNC: 13.4 G/DL
LYMPHOCYTES # BLD AUTO: 1.5 K/UL
LYMPHOCYTES NFR BLD: 30.7 %
MCH RBC QN AUTO: 30.6 PG
MCHC RBC AUTO-ENTMCNC: 33.4 %
MCV RBC AUTO: 92 FL
METHADONE UR QL SCN>300 NG/ML: NEGATIVE
MONOCYTES # BLD AUTO: 0.4 K/UL
MONOCYTES NFR BLD: 7.6 %
NEUTROPHILS # BLD AUTO: 2.9 K/UL
NEUTROPHILS NFR BLD: 60.2 %
OPIATES UR QL SCN: NORMAL
PCP UR QL SCN>25 NG/ML: NEGATIVE
PLATELET # BLD AUTO: 242 K/UL
PMV BLD AUTO: 8.5 FL
POTASSIUM SERPL-SCNC: 3.3 MMOL/L
PROT SERPL-MCNC: 6.5 G/DL
RBC # BLD AUTO: 4.38 M/UL
SODIUM SERPL-SCNC: 142 MMOL/L
TOXICOLOGY INFORMATION: NORMAL
WBC # BLD AUTO: 4.9 K/UL

## 2017-07-03 PROCEDURE — 80307 DRUG TEST PRSMV CHEM ANLYZR: CPT

## 2017-07-03 PROCEDURE — 86140 C-REACTIVE PROTEIN: CPT

## 2017-07-03 PROCEDURE — 99999 PR PBB SHADOW E&M-EST. PATIENT-LVL III: CPT | Mod: PBBFAC,,, | Performed by: FAMILY MEDICINE

## 2017-07-03 PROCEDURE — 36415 COLL VENOUS BLD VENIPUNCTURE: CPT

## 2017-07-03 PROCEDURE — 99213 OFFICE O/P EST LOW 20 MIN: CPT | Mod: PBBFAC,27,PO | Performed by: FAMILY MEDICINE

## 2017-07-03 PROCEDURE — 1125F AMNT PAIN NOTED PAIN PRSNT: CPT | Mod: ,,, | Performed by: INTERNAL MEDICINE

## 2017-07-03 PROCEDURE — 80053 COMPREHEN METABOLIC PANEL: CPT

## 2017-07-03 PROCEDURE — 1125F AMNT PAIN NOTED PAIN PRSNT: CPT | Mod: ,,, | Performed by: FAMILY MEDICINE

## 2017-07-03 PROCEDURE — 1159F MED LIST DOCD IN RCRD: CPT | Mod: ,,, | Performed by: FAMILY MEDICINE

## 2017-07-03 PROCEDURE — 85651 RBC SED RATE NONAUTOMATED: CPT

## 2017-07-03 PROCEDURE — 85025 COMPLETE CBC W/AUTO DIFF WBC: CPT

## 2017-07-03 PROCEDURE — 1159F MED LIST DOCD IN RCRD: CPT | Mod: ,,, | Performed by: INTERNAL MEDICINE

## 2017-07-03 PROCEDURE — 99214 OFFICE O/P EST MOD 30 MIN: CPT | Mod: S$PBB,,, | Performed by: FAMILY MEDICINE

## 2017-07-03 PROCEDURE — 99999 PR PBB SHADOW E&M-EST. PATIENT-LVL III: CPT | Mod: PBBFAC,,, | Performed by: INTERNAL MEDICINE

## 2017-07-03 PROCEDURE — 99214 OFFICE O/P EST MOD 30 MIN: CPT | Mod: S$PBB,,, | Performed by: INTERNAL MEDICINE

## 2017-07-03 RX ORDER — HYDROCODONE BITARTRATE AND ACETAMINOPHEN 5; 325 MG/1; MG/1
1 TABLET ORAL EVERY 6 HOURS PRN
Qty: 120 TABLET | Refills: 0 | Status: SHIPPED | OUTPATIENT
Start: 2017-07-10 | End: 2017-08-10 | Stop reason: SDUPTHER

## 2017-07-03 ASSESSMENT — ROUTINE ASSESSMENT OF PATIENT INDEX DATA (RAPID3)
TOTAL RAPID3 SCORE: 7.05
PSYCHOLOGICAL DISTRESS SCORE: 1.1
MDHAQ FUNCTION SCORE: 1.7
PAIN SCORE: 8.5
PATIENT GLOBAL ASSESSMENT SCORE: 7

## 2017-07-03 ASSESSMENT — DISEASE ACTIVITY SCORE (DAS28)
TOTAL_SCORE_ESR: 1.55
TENDER_JOINTS_COUNT: 0
ESR_MM_PER_HR: 8
SWOLLEN_JOINTS_COUNT: 0
GLOBAL_HEALTH_SCORE: 7

## 2017-07-03 NOTE — PROGRESS NOTES
Subjective:       Patient ID: Joceline Holcomb is a 66 y.o. female.    Chief Complaint: Seronegative Rheumatoid arthritis  HPI 66-year-old female is here for follow up for seronegative rheumatoid arthritis.   Currently on methotrexate 10 mg a week.  Plaquenil discontinued secondary to palmar rash. In the past she has taken sulfasalazine (not effective) as well as prednisone.    Also on on INH and pyridoxine for latent TB.  Tolerating medications without any side effects.   Denies any new joint pain or swelling.   She had a fall in Nov 2016- left shoulder injury ?fracture- managed by Dr Johanson.  Last DEXA was in September 2016-stable per patient.  Continues to be on Prolia  She denies fever, weight loss, dry eyes or mouth, photosensitivity, ulcer, raynaud's phenomenon, alopecia, dysphagia, diarrhea or blood in the stools    Review of Systems   Constitutional: Negative for fatigue and fever.   HENT: Negative for facial swelling.    Eyes: Negative for pain and redness.   Respiratory: Negative for cough and shortness of breath.    Cardiovascular: Negative for chest pain and leg swelling.   Gastrointestinal: Negative for abdominal distention and abdominal pain.   Musculoskeletal: Negative for arthralgias and gait problem.   Neurological: Negative for tremors and seizures.   Psychiatric/Behavioral: Negative for agitation and behavioral problems.         Objective:     /72 (BP Location: Right arm, Patient Position: Sitting, BP Method: Automatic)   Pulse (!) 57   Ht 5' (1.524 m)   Wt 48.2 kg (106 lb 4.2 oz)   BMI 20.75 kg/m²      Physical Exam   Constitutional: She is oriented to person, place, and time and well-developed, well-nourished, and in no distress.   HENT:   Head: Normocephalic and atraumatic.   Eyes: Conjunctivae are normal. Pupils are equal, round, and reactive to light.   Neck: Neck supple.   Cardiovascular: Normal rate and regular rhythm.    Pulmonary/Chest: Effort normal and breath sounds  normal.   Abdominal: Soft. Bowel sounds are normal.       Right Side Rheumatological Exam     Examination finds the shoulder, elbow, wrist, 1st PIP, 1st MCP, 2nd PIP, 2nd MCP, 3rd PIP, 3rd MCP, 4th PIP, 4th MCP, 5th PIP and 5th MCP normal.    The patient has an enlarged knee    Left Side Rheumatological Exam     Examination finds the shoulder, elbow, wrist, 1st PIP, 1st MCP, 2nd PIP, 2nd MCP, 3rd PIP, 3rd MCP, 4th PIP, 4th MCP, 5th PIP and 5th MCP normal.    The patient has an enlarged knee.      Neurological: She is alert and oriented to person, place, and time.   Skin: Skin is warm and dry. No rash noted.     Psychiatric: Mood and affect normal.   Musculoskeletal:    No joint swelling                 Lab Results   Component Value Date    WBC 7.20 05/15/2017    HGB 12.9 05/15/2017    HCT 38.8 05/15/2017    MCV 91 05/15/2017     05/15/2017     CMP  Sodium   Date Value Ref Range Status   05/15/2017 143 136 - 145 mmol/L Final     Potassium   Date Value Ref Range Status   05/15/2017 4.2 3.5 - 5.1 mmol/L Final     Chloride   Date Value Ref Range Status   05/15/2017 106 95 - 110 mmol/L Final     CO2   Date Value Ref Range Status   05/15/2017 28 23 - 29 mmol/L Final     Glucose   Date Value Ref Range Status   05/15/2017 85 70 - 110 mg/dL Final     BUN, Bld   Date Value Ref Range Status   05/15/2017 14 8 - 23 mg/dL Final     Creatinine   Date Value Ref Range Status   05/15/2017 0.8 0.5 - 1.4 mg/dL Final     Calcium   Date Value Ref Range Status   05/15/2017 9.8 8.7 - 10.5 mg/dL Final     Total Protein   Date Value Ref Range Status   05/15/2017 6.4 6.0 - 8.4 g/dL Final     Albumin   Date Value Ref Range Status   05/15/2017 3.7 3.5 - 5.2 g/dL Final     Total Bilirubin   Date Value Ref Range Status   05/15/2017 0.5 0.1 - 1.0 mg/dL Final     Comment:     For infants and newborns, interpretation of results should be based  on gestational age, weight and in agreement with clinical  observations.  Premature Infant  recommended reference ranges:  Up to 24 hours.............<8.0 mg/dL  Up to 48 hours............<12.0 mg/dL  3-5 days..................<15.0 mg/dL  6-29 days.................<15.0 mg/dL       Alkaline Phosphatase   Date Value Ref Range Status   05/15/2017 71 55 - 135 U/L Final     AST   Date Value Ref Range Status   05/15/2017 22 10 - 40 U/L Final     ALT   Date Value Ref Range Status   05/15/2017 14 10 - 44 U/L Final     Anion Gap   Date Value Ref Range Status   05/15/2017 9 8 - 16 mmol/L Final     eGFR if    Date Value Ref Range Status   05/15/2017 >60 >60 mL/min/1.73 m^2 Final     eGFR if non    Date Value Ref Range Status   05/15/2017 >60 >60 mL/min/1.73 m^2 Final     Comment:     Calculation used to obtain the estimated glomerular filtration  rate (eGFR) is the CKD-EPI equation. Since race is unknown   in our information system, the eGFR values for   -American and Non--American patients are given   for each creatinine result.       Lab Results   Component Value Date    SEDRATE 11 05/15/2017     Lab Results   Component Value Date    CRP 5.9 05/15/2017       Assessment:   Seronegative  rheumatoid arthritis DAS28 1.55   Latent TB-on INH and pyridoxine   Scaly rash on palms-?  Psoriasis secondary to Plaquenil-hold medication  Osteoporosis-on prolia, took BPs in the past. Last DEXA in Sep 2016- stable. Managed by Dr Johanson   Osteoarthritis of bilateral knees  Long-term use of methotrexate  Plan:   Cont methotrexate 10 mg per week  Continue folic acid 1 mg a day  Continue Prolia   Continue INH and Pyridoxine  RTC in 2 months with labs

## 2017-07-06 NOTE — PROGRESS NOTES
Subjective:       Patient ID: Joceline Holcomb is a 66 y.o. female.    Chief Complaint: Follow-up    Patient Active Problem List   Diagnosis    Rheumatoid arthritis involving both hands with negative rheumatoid factor    Osteoporosis    Hypertension, well controlled    Long term methotrexate user    Vitamin D deficiency    Marijuana use    Body mass index (BMI) less than or equal to 19 in adult   chronic pain from RA.  Has not established with new pain mgmt.  Contends she has stopped using THC since our first appt 6 months ago though + on last visit.  Had been a heavy daily user and  still smokes around her.  Would like me to repeat the UDS    HPI  Review of Systems   Constitutional: Negative for fatigue and unexpected weight change.   Respiratory: Negative for chest tightness and shortness of breath.    Cardiovascular: Negative for chest pain, palpitations and leg swelling.   Gastrointestinal: Negative for abdominal pain.   Endocrine: Negative for polydipsia, polyphagia and polyuria.   Musculoskeletal: Positive for arthralgias and back pain.   Neurological: Negative for dizziness, syncope, light-headedness and headaches.       Objective:      Physical Exam   Constitutional: She is oriented to person, place, and time. She appears well-developed and well-nourished.   Cardiovascular: Normal rate, regular rhythm and normal heart sounds.    Pulmonary/Chest: Effort normal and breath sounds normal.   Musculoskeletal: She exhibits no edema.   Neurological: She is alert and oriented to person, place, and time.   Skin: Skin is warm and dry.   Psychiatric: She has a normal mood and affect.   Nursing note and vitals reviewed.      Assessment:       1. Other chronic pain    2. Chronic pain disorder    3. Hypertension, well controlled    4. Vitamin D deficiency    5. Rheumatoid arthritis involving both hands with negative rheumatoid factor    6. Body mass index (BMI) less than or equal to 19 in adult    7.  Marijuana use    8. Long term methotrexate user        Plan:       1. Other chronic pain  Screen and treat as indicated:    - TOXICOLOGY SCREEN, URINE, RANDOM (COMPLIANCE)    2. Chronic pain disorder  Counseled patient on habit forming potential of opiates, risk of sedation, respiratory suppression or arrest especially if used in conjunction with benzodiazepines or alcohol.  Counseled patient to avoid driving while on the medication, rules of the pain contract and need for routine 3 month follow ups.  Patient agrees to all aspects of the plan of care and wishes to proceed with therapy.  The plan is always to use alternatives less habit forming, to utilize interventions and therapies that reduce pain as an adjunctive treatment, and limit and wean the dose of narcotics as soon as able and appropriate.    DPS checked with no evidence of diversion  - hydrocodone-acetaminophen 5-325mg (NORCO) 5-325 mg per tablet; Take 1 tablet by mouth every 6 (six) hours as needed for Pain.  Dispense: 120 tablet; Refill: 0    3. Hypertension, well controlled  Controlled on current medications.  Continue current medications.      4. Vitamin D deficiency  Stable condition.  Continue current medications.  Will adjust based on lab findings or if condition changes.      5. Rheumatoid arthritis involving both hands with negative rheumatoid factor  Cont current meds and rheum care    6. Body mass index (BMI) less than or equal to 19 in adult  Stable. Slight improvement  Cont nutrition supplementations    7. Marijuana use  Patient has discontinued and understands danger of mixing with    8. Long term methotrexate user  Stable condition.  Continue current medications.  Will adjust based on lab findings or if condition changes.    Cont rheum care    Reeval 3 months or sooner prn

## 2017-07-13 DIAGNOSIS — M06.041 RHEUMATOID ARTHRITIS INVOLVING BOTH HANDS WITH NEGATIVE RHEUMATOID FACTOR: ICD-10-CM

## 2017-07-13 DIAGNOSIS — M06.042 RHEUMATOID ARTHRITIS INVOLVING BOTH HANDS WITH NEGATIVE RHEUMATOID FACTOR: ICD-10-CM

## 2017-07-13 RX ORDER — METHOTREXATE 2.5 MG/1
TABLET ORAL
Qty: 20 TABLET | Refills: 0 | Status: SHIPPED | OUTPATIENT
Start: 2017-07-13 | End: 2017-11-03 | Stop reason: SDUPTHER

## 2017-07-19 DIAGNOSIS — I10 HYPERTENSION, WELL CONTROLLED: ICD-10-CM

## 2017-07-19 RX ORDER — LOSARTAN POTASSIUM AND HYDROCHLOROTHIAZIDE 12.5; 5 MG/1; MG/1
TABLET ORAL
Qty: 90 TABLET | Refills: 3 | Status: SHIPPED | OUTPATIENT
Start: 2017-07-19 | End: 2018-08-23 | Stop reason: SDUPTHER

## 2017-08-10 DIAGNOSIS — G89.4 CHRONIC PAIN DISORDER: ICD-10-CM

## 2017-08-10 NOTE — TELEPHONE ENCOUNTER
----- Message from Lisette Boydza sent at 8/10/2017  4:15 PM CDT -----  Contact: self [phone]  She is requesting a refill of the hydrocodone be sent to:   [facility] - [place], [place] - [address]  [address]  [facility] [address]  Phone: [phone] Fax: [phone]    Thank you!

## 2017-08-11 RX ORDER — HYDROCODONE BITARTRATE AND ACETAMINOPHEN 5; 325 MG/1; MG/1
1 TABLET ORAL EVERY 6 HOURS PRN
Qty: 120 TABLET | Refills: 0 | Status: SHIPPED | OUTPATIENT
Start: 2017-08-11 | End: 2017-09-08 | Stop reason: SDUPTHER

## 2017-08-15 DIAGNOSIS — M06.041 RHEUMATOID ARTHRITIS INVOLVING BOTH HANDS WITH NEGATIVE RHEUMATOID FACTOR: ICD-10-CM

## 2017-08-15 DIAGNOSIS — M06.042 RHEUMATOID ARTHRITIS INVOLVING BOTH HANDS WITH NEGATIVE RHEUMATOID FACTOR: ICD-10-CM

## 2017-08-15 RX ORDER — METHOTREXATE 2.5 MG/1
TABLET ORAL
Qty: 20 TABLET | Refills: 0 | OUTPATIENT
Start: 2017-08-15

## 2017-09-08 DIAGNOSIS — G89.4 CHRONIC PAIN DISORDER: ICD-10-CM

## 2017-09-09 RX ORDER — HYDROCODONE BITARTRATE AND ACETAMINOPHEN 5; 325 MG/1; MG/1
1 TABLET ORAL EVERY 6 HOURS PRN
Qty: 120 TABLET | Refills: 0 | Status: SHIPPED | OUTPATIENT
Start: 2017-09-09 | End: 2017-10-03 | Stop reason: SDUPTHER

## 2017-09-28 DIAGNOSIS — Z12.11 COLON CANCER SCREENING: ICD-10-CM

## 2017-09-29 DIAGNOSIS — Z12.39 SCREENING FOR BREAST CANCER: Primary | ICD-10-CM

## 2017-10-02 ENCOUNTER — DOCUMENTATION ONLY (OUTPATIENT)
Dept: FAMILY MEDICINE | Facility: CLINIC | Age: 67
End: 2017-10-02

## 2017-10-02 NOTE — PROGRESS NOTES
Pre-Visit Chart Review  For Appointment Scheduled on 10-3-17    Health Maintenance Due   Topic Date Due    TETANUS VACCINE  09/12/1968    Influenza Vaccine  08/01/2017    Fecal Occult Blood Test (FOBT)/FitKit  09/21/2017    Mammogram  09/24/2017

## 2017-10-03 ENCOUNTER — OFFICE VISIT (OUTPATIENT)
Dept: FAMILY MEDICINE | Facility: CLINIC | Age: 67
End: 2017-10-03
Payer: MEDICARE

## 2017-10-03 VITALS
WEIGHT: 117.06 LBS | BODY MASS INDEX: 22.98 KG/M2 | HEIGHT: 60 IN | TEMPERATURE: 98 F | HEART RATE: 57 BPM | DIASTOLIC BLOOD PRESSURE: 80 MMHG | SYSTOLIC BLOOD PRESSURE: 139 MMHG

## 2017-10-03 DIAGNOSIS — I10 HYPERTENSION, WELL CONTROLLED: Primary | ICD-10-CM

## 2017-10-03 DIAGNOSIS — G89.4 CHRONIC PAIN DISORDER: ICD-10-CM

## 2017-10-03 DIAGNOSIS — E55.9 VITAMIN D DEFICIENCY: ICD-10-CM

## 2017-10-03 DIAGNOSIS — F11.20 CONTINUOUS OPIOID DEPENDENCE: ICD-10-CM

## 2017-10-03 PROCEDURE — 99214 OFFICE O/P EST MOD 30 MIN: CPT | Mod: S$PBB,,, | Performed by: FAMILY MEDICINE

## 2017-10-03 PROCEDURE — 99213 OFFICE O/P EST LOW 20 MIN: CPT | Mod: PBBFAC,PO | Performed by: FAMILY MEDICINE

## 2017-10-03 PROCEDURE — 99999 PR PBB SHADOW E&M-EST. PATIENT-LVL III: CPT | Mod: PBBFAC,,, | Performed by: FAMILY MEDICINE

## 2017-10-03 RX ORDER — HYDROCODONE BITARTRATE AND ACETAMINOPHEN 5; 325 MG/1; MG/1
1 TABLET ORAL EVERY 6 HOURS PRN
Qty: 120 TABLET | Refills: 0 | Status: SHIPPED | OUTPATIENT
Start: 2017-10-03 | End: 2017-11-06 | Stop reason: SDUPTHER

## 2017-10-03 NOTE — PROGRESS NOTES
Subjective:       Patient ID: Joceline Holcomb is a 67 y.o. female.    Chief Complaint: Follow-up    Patient Active Problem List   Diagnosis    Rheumatoid arthritis involving both hands with negative rheumatoid factor    Osteoporosis    Hypertension, well controlled    Long term methotrexate user    Vitamin D deficiency    Marijuana use    Body mass index (BMI) less than or equal to 19 in adult   Chronic opiate use for RA-stable and well controlled.  Contract 12/16.  UDS failed first time for THC then repeat neg 7/17  Hydrocodone 5/325 qid.  DPS checked today-no evidence of diversion  HPI  Review of Systems   Constitutional: Negative for fatigue and unexpected weight change.   Respiratory: Negative for chest tightness and shortness of breath.    Cardiovascular: Negative for chest pain, palpitations and leg swelling.   Gastrointestinal: Negative for abdominal pain.   Musculoskeletal: Positive for arthralgias.   Neurological: Negative for dizziness, syncope, light-headedness and headaches.       Objective:      Physical Exam   Constitutional: She is oriented to person, place, and time. She appears well-developed and well-nourished.   Cardiovascular: Normal rate, regular rhythm and normal heart sounds.    Pulmonary/Chest: Effort normal and breath sounds normal.   Musculoskeletal: She exhibits no edema.   Neurological: She is alert and oriented to person, place, and time.   Skin: Skin is warm and dry.   Psychiatric: She has a normal mood and affect.   Nursing note and vitals reviewed.      Assessment:       1. Hypertension, well controlled    2. Chronic pain disorder    3. Vitamin D deficiency    4. Continuous opioid dependence-contract 12/9/16.  UDS failed for THC  then repeat 7/17 neg for thc        Plan:       1. Chronic pain disorde  Counseled patient on habit forming potential of opiates, risk of sedation, respiratory suppression or arrest especially if used in conjunction with benzodiazepines or alcohol.   Counseled patient to avoid driving while on the medication, rules of the pain contract and need for routine 3 month follow ups.  Patient agrees to all aspects of the plan of care and wishes to proceed with therapy.  The plan is always to use alternatives less habit forming, to utilize interventions and therapies that reduce pain as an adjunctive treatment, and limit and wean the dose of narcotics as soon as able and appropriate.    Controlled on current medications.  Continue current medications.    - hydrocodone-acetaminophen 5-325mg (NORCO) 5-325 mg per tablet; Take 1 tablet by mouth every 6 (six) hours as needed for Pain.  Dispense: 120 tablet; Refill: 0    2. Hypertension, well controlled  Controlled on current medications.  Continue current medications.    - Comprehensive metabolic panel; Future  - CBC auto differential; Future  - Lipid panel; Future    3. Vitamin D deficiency  Stable condition.  Continue current medications.  Will adjust based on lab findings or if condition changes.    - Vitamin D; Future    4. Continuous opioid dependence-contract 12/9/16.  UDS failed for THC  then repeat 7/17 neg for thc  Controlled on current medications.  Continue current medications.      5. BMI 22.0-22.9, adult  Improving.  Encourage good nutrition.      [facility] goal documentation:  Patient readiness: acceptance and barriers:readiness  During the course of the visit the patient was educated and counseled about the following: Hypertension:   Dietary sodium restriction.  Regular aerobic exercise.  Underweight:  Nutritional supplements  Goals: Hypertension: Reduce Blood Pressure and Underweight: Increase calorie intake and BMI    Goal/Outcomes met:Hypertension  The following self management tools provided:none  Patient Instructions (the written plan) was given to the patient/family: Yes  Time spent with patient: 20 minutes    Patient with be reevaluated in 3 months or sooner prn    Greater than 50% of this visit was spent  counseling as described in above documentation:Yes

## 2017-10-19 ENCOUNTER — TELEPHONE (OUTPATIENT)
Dept: HEMATOLOGY/ONCOLOGY | Facility: CLINIC | Age: 67
End: 2017-10-19

## 2017-10-19 DIAGNOSIS — M06.041 RHEUMATOID ARTHRITIS INVOLVING BOTH HANDS WITH NEGATIVE RHEUMATOID FACTOR: ICD-10-CM

## 2017-10-19 DIAGNOSIS — M06.042 RHEUMATOID ARTHRITIS INVOLVING BOTH HANDS WITH NEGATIVE RHEUMATOID FACTOR: ICD-10-CM

## 2017-10-19 RX ORDER — METHOTREXATE 2.5 MG/1
TABLET ORAL
Qty: 20 TABLET | Refills: 0 | Status: CANCELLED | OUTPATIENT
Start: 2017-10-19

## 2017-10-19 NOTE — TELEPHONE ENCOUNTER
----- Message from Lisette Del sent at 10/18/2017  2:12 PM CDT -----  Contact: self [phone]  She is requesting a refill of the methotrexate be sent to:   [facility] - [place], [place] - [address]  [address]  [facility] [address]  Phone: [phone] Fax: [phone]    Thank you!

## 2017-10-23 ENCOUNTER — TELEPHONE (OUTPATIENT)
Dept: RHEUMATOLOGY | Facility: CLINIC | Age: 67
End: 2017-10-23

## 2017-10-23 NOTE — TELEPHONE ENCOUNTER
----- Message from Rhonda Barrett MD sent at 10/19/2017  3:04 PM CDT -----  Contact: self [phone]  Needs labs for medication refill. SS  ----- Message -----  From: Kasey Springer RN  Sent: 10/19/2017   1:56 PM  To: Rhonda Barrett MD    I Apologize for sending this doc, But when I attempted to route this to wes VEGA. I realized  the medication is actually one you prescribe.   ----- Message -----  From: Lisette Mathis  Sent: 10/18/2017   2:12 PM  To: Ludin JACK Staff    She is requesting a refill of the methotrexate be sent to:   [facility] - [place] - [address] AT [address] [facility] [address]  [address]  [facility] [address]  Phone: [phone] Fax: [phone]    Thank you!

## 2017-10-23 NOTE — TELEPHONE ENCOUNTER
Spoke with pt and informed will need labs to refill Rx.  Will call back to schedule do to not sure when she will be able to get a ride.

## 2017-10-26 ENCOUNTER — TELEPHONE (OUTPATIENT)
Dept: RHEUMATOLOGY | Facility: CLINIC | Age: 67
End: 2017-10-26

## 2017-11-03 DIAGNOSIS — M06.041 RHEUMATOID ARTHRITIS INVOLVING BOTH HANDS WITH NEGATIVE RHEUMATOID FACTOR: ICD-10-CM

## 2017-11-03 DIAGNOSIS — M06.042 RHEUMATOID ARTHRITIS INVOLVING BOTH HANDS WITH NEGATIVE RHEUMATOID FACTOR: ICD-10-CM

## 2017-11-03 RX ORDER — METHOTREXATE 2.5 MG/1
TABLET ORAL
Qty: 20 TABLET | Refills: 2 | Status: SHIPPED | OUTPATIENT
Start: 2017-11-03 | End: 2018-02-17 | Stop reason: SDUPTHER

## 2017-11-06 DIAGNOSIS — G89.4 CHRONIC PAIN DISORDER: ICD-10-CM

## 2017-11-06 NOTE — TELEPHONE ENCOUNTER
Patient requesting a refill on pended medication. Last OV 10/3/17 with .   Last Tox 7/3/17  Pain contract 12/9/2016

## 2017-11-07 RX ORDER — HYDROCODONE BITARTRATE AND ACETAMINOPHEN 5; 325 MG/1; MG/1
1 TABLET ORAL EVERY 6 HOURS PRN
Qty: 120 TABLET | Refills: 0 | Status: SHIPPED | OUTPATIENT
Start: 2017-11-07 | End: 2017-12-04 | Stop reason: SDUPTHER

## 2017-11-21 ENCOUNTER — TELEPHONE (OUTPATIENT)
Dept: FAMILY MEDICINE | Facility: CLINIC | Age: 67
End: 2017-11-21

## 2017-11-21 NOTE — TELEPHONE ENCOUNTER
----- Message from Norma Childress sent at 11/21/2017  8:09 AM CST -----  Contact: self  Patient called regarding her disability tag. Used to be a patient of Dr. Simmons and was told if she needed to have another one printed it would still be on her file. Please contact [phone] (home)

## 2017-11-21 NOTE — TELEPHONE ENCOUNTER
Patient thought she needed a new letter for a handicap tag. She called the DMV and they advised that she did not need anything.

## 2017-12-04 DIAGNOSIS — G89.4 CHRONIC PAIN DISORDER: ICD-10-CM

## 2017-12-04 NOTE — TELEPHONE ENCOUNTER
----- Message from Fernanda Rodríguez sent at 12/4/2017  9:15 AM CST -----  Patient needs a refill of medication:Hydrocodone called into [facility]'s pharmacy. Please order or call patient back at [phone] if you have any questions. Thanks!

## 2017-12-05 RX ORDER — HYDROCODONE BITARTRATE AND ACETAMINOPHEN 5; 325 MG/1; MG/1
1 TABLET ORAL EVERY 6 HOURS PRN
Qty: 120 TABLET | Refills: 0 | Status: SHIPPED | OUTPATIENT
Start: 2017-12-05 | End: 2018-01-04 | Stop reason: SDUPTHER

## 2017-12-26 DIAGNOSIS — Z22.7 LATENT TUBERCULOSIS BY BLOOD TEST: ICD-10-CM

## 2017-12-26 RX ORDER — ISONIAZID 300 MG/1
TABLET ORAL
Qty: 90 TABLET | Refills: 0 | Status: SHIPPED | OUTPATIENT
Start: 2017-12-26 | End: 2018-04-02 | Stop reason: ALTCHOICE

## 2018-01-04 DIAGNOSIS — G89.4 CHRONIC PAIN DISORDER: ICD-10-CM

## 2018-01-04 NOTE — TELEPHONE ENCOUNTER
----- Message from Chasity Michael sent at 1/4/2018  8:22 AM CST -----  Contact: Patient  Joceline, patient [phone], calling for a refill on Rx hydrocodone-acetaminophen 5-325mg (NORCO) 5-325 mg per tablet. Please advise. Thanks.  Due on Sunday, will need by Saturday evening.  [facility] [place] [address] [address] [address] [address] [facility] [address]  Phone: [phone] Fax: [phone]

## 2018-01-05 RX ORDER — HYDROCODONE BITARTRATE AND ACETAMINOPHEN 5; 325 MG/1; MG/1
1 TABLET ORAL EVERY 6 HOURS PRN
Qty: 120 TABLET | Refills: 0 | Status: SHIPPED | OUTPATIENT
Start: 2018-01-05 | End: 2018-02-02 | Stop reason: SDUPTHER

## 2018-01-08 DIAGNOSIS — Z22.7 LATENT TUBERCULOSIS BY BLOOD TEST: ICD-10-CM

## 2018-01-08 RX ORDER — PYRIDOXINE HCL (VITAMIN B6) 50 MG
TABLET ORAL
Qty: 90 TABLET | Refills: 0 | OUTPATIENT
Start: 2018-01-08

## 2018-01-17 DIAGNOSIS — Z78.0 ASYMPTOMATIC MENOPAUSAL STATE: Primary | ICD-10-CM

## 2018-01-19 ENCOUNTER — DOCUMENTATION ONLY (OUTPATIENT)
Dept: FAMILY MEDICINE | Facility: CLINIC | Age: 68
End: 2018-01-19

## 2018-01-19 NOTE — PROGRESS NOTES
Pre-Visit Chart Review  For Appointment Scheduled on 1-22-18    Health Maintenance Due   Topic Date Due    TETANUS VACCINE  09/12/1968    Influenza Vaccine  08/01/2017    Fecal Occult Blood Test (FOBT)/FitKit  09/21/2017    Mammogram  09/24/2017    DEXA SCAN  12/01/2017

## 2018-01-22 ENCOUNTER — LAB VISIT (OUTPATIENT)
Dept: LAB | Facility: HOSPITAL | Age: 68
End: 2018-01-22
Attending: FAMILY MEDICINE
Payer: MEDICARE

## 2018-01-22 DIAGNOSIS — E55.9 VITAMIN D DEFICIENCY: ICD-10-CM

## 2018-01-22 DIAGNOSIS — I10 HYPERTENSION, WELL CONTROLLED: ICD-10-CM

## 2018-01-22 LAB
25(OH)D3+25(OH)D2 SERPL-MCNC: 22 NG/ML
ALBUMIN SERPL BCP-MCNC: 3.7 G/DL
ALP SERPL-CCNC: 120 U/L
ALT SERPL W/O P-5'-P-CCNC: 47 U/L
ANION GAP SERPL CALC-SCNC: 8 MMOL/L
AST SERPL-CCNC: 27 U/L
BASOPHILS # BLD AUTO: 0.08 K/UL
BASOPHILS NFR BLD: 1.5 %
BILIRUB SERPL-MCNC: 0.6 MG/DL
BUN SERPL-MCNC: 20 MG/DL
CALCIUM SERPL-MCNC: 9.8 MG/DL
CHLORIDE SERPL-SCNC: 104 MMOL/L
CHOLEST SERPL-MCNC: 217 MG/DL
CHOLEST/HDLC SERPL: 3.4 {RATIO}
CO2 SERPL-SCNC: 29 MMOL/L
CREAT SERPL-MCNC: 1.1 MG/DL
DIFFERENTIAL METHOD: ABNORMAL
EOSINOPHIL # BLD AUTO: 0.1 K/UL
EOSINOPHIL NFR BLD: 1.9 %
ERYTHROCYTE [DISTWIDTH] IN BLOOD BY AUTOMATED COUNT: 16 %
EST. GFR  (AFRICAN AMERICAN): 59.2 ML/MIN/1.73 M^2
EST. GFR  (NON AFRICAN AMERICAN): 51.3 ML/MIN/1.73 M^2
GLUCOSE SERPL-MCNC: 96 MG/DL
HCT VFR BLD AUTO: 40.1 %
HDLC SERPL-MCNC: 64 MG/DL
HDLC SERPL: 29.5 %
HGB BLD-MCNC: 12.9 G/DL
IMM GRANULOCYTES # BLD AUTO: 0.02 K/UL
IMM GRANULOCYTES NFR BLD AUTO: 0.4 %
LDLC SERPL CALC-MCNC: 136.6 MG/DL
LYMPHOCYTES # BLD AUTO: 1.5 K/UL
LYMPHOCYTES NFR BLD: 29 %
MCH RBC QN AUTO: 30.9 PG
MCHC RBC AUTO-ENTMCNC: 32.2 G/DL
MCV RBC AUTO: 96 FL
MONOCYTES # BLD AUTO: 0.4 K/UL
MONOCYTES NFR BLD: 7.3 %
NEUTROPHILS # BLD AUTO: 3.1 K/UL
NEUTROPHILS NFR BLD: 59.9 %
NONHDLC SERPL-MCNC: 153 MG/DL
NRBC BLD-RTO: 0 /100 WBC
PLATELET # BLD AUTO: 292 K/UL
PMV BLD AUTO: 10.8 FL
POTASSIUM SERPL-SCNC: 4.3 MMOL/L
PROT SERPL-MCNC: 6.8 G/DL
RBC # BLD AUTO: 4.18 M/UL
SODIUM SERPL-SCNC: 141 MMOL/L
TRIGL SERPL-MCNC: 82 MG/DL
WBC # BLD AUTO: 5.24 K/UL

## 2018-01-22 PROCEDURE — 80061 LIPID PANEL: CPT

## 2018-01-22 PROCEDURE — 80053 COMPREHEN METABOLIC PANEL: CPT

## 2018-01-22 PROCEDURE — 85025 COMPLETE CBC W/AUTO DIFF WBC: CPT

## 2018-01-22 PROCEDURE — 82306 VITAMIN D 25 HYDROXY: CPT

## 2018-01-22 PROCEDURE — 36415 COLL VENOUS BLD VENIPUNCTURE: CPT | Mod: PO

## 2018-02-01 DIAGNOSIS — M06.042 RHEUMATOID ARTHRITIS INVOLVING BOTH HANDS WITH NEGATIVE RHEUMATOID FACTOR: ICD-10-CM

## 2018-02-01 DIAGNOSIS — M06.041 RHEUMATOID ARTHRITIS INVOLVING BOTH HANDS WITH NEGATIVE RHEUMATOID FACTOR: ICD-10-CM

## 2018-02-01 RX ORDER — FOLIC ACID 1 MG/1
TABLET ORAL
Qty: 90 TABLET | Refills: 0 | Status: SHIPPED | OUTPATIENT
Start: 2018-02-01 | End: 2018-03-19 | Stop reason: SDUPTHER

## 2018-02-02 ENCOUNTER — OFFICE VISIT (OUTPATIENT)
Dept: FAMILY MEDICINE | Facility: CLINIC | Age: 68
End: 2018-02-02
Payer: MEDICARE

## 2018-02-02 ENCOUNTER — DOCUMENTATION ONLY (OUTPATIENT)
Dept: FAMILY MEDICINE | Facility: CLINIC | Age: 68
End: 2018-02-02

## 2018-02-02 VITALS
HEART RATE: 77 BPM | HEIGHT: 60 IN | BODY MASS INDEX: 21.6 KG/M2 | WEIGHT: 110 LBS | DIASTOLIC BLOOD PRESSURE: 71 MMHG | TEMPERATURE: 98 F | SYSTOLIC BLOOD PRESSURE: 114 MMHG

## 2018-02-02 DIAGNOSIS — M06.042 RHEUMATOID ARTHRITIS INVOLVING BOTH HANDS WITH NEGATIVE RHEUMATOID FACTOR: ICD-10-CM

## 2018-02-02 DIAGNOSIS — I10 HYPERTENSION, WELL CONTROLLED: ICD-10-CM

## 2018-02-02 DIAGNOSIS — Z79.631 LONG TERM METHOTREXATE USER: ICD-10-CM

## 2018-02-02 DIAGNOSIS — Z12.11 ENCOUNTER FOR FIT (FECAL IMMUNOCHEMICAL TEST) SCREENING: ICD-10-CM

## 2018-02-02 DIAGNOSIS — G89.4 CHRONIC PAIN DISORDER: ICD-10-CM

## 2018-02-02 DIAGNOSIS — M81.0 OSTEOPOROSIS, UNSPECIFIED OSTEOPOROSIS TYPE, UNSPECIFIED PATHOLOGICAL FRACTURE PRESENCE: Primary | ICD-10-CM

## 2018-02-02 DIAGNOSIS — M06.041 RHEUMATOID ARTHRITIS INVOLVING BOTH HANDS WITH NEGATIVE RHEUMATOID FACTOR: ICD-10-CM

## 2018-02-02 DIAGNOSIS — F12.90 MARIJUANA USE: ICD-10-CM

## 2018-02-02 DIAGNOSIS — E55.9 VITAMIN D DEFICIENCY: ICD-10-CM

## 2018-02-02 PROCEDURE — 1159F MED LIST DOCD IN RCRD: CPT | Mod: ,,, | Performed by: FAMILY MEDICINE

## 2018-02-02 PROCEDURE — 99999 PR PBB SHADOW E&M-EST. PATIENT-LVL III: CPT | Mod: PBBFAC,,, | Performed by: FAMILY MEDICINE

## 2018-02-02 PROCEDURE — 1125F AMNT PAIN NOTED PAIN PRSNT: CPT | Mod: ,,, | Performed by: FAMILY MEDICINE

## 2018-02-02 PROCEDURE — 99213 OFFICE O/P EST LOW 20 MIN: CPT | Mod: PBBFAC,PO | Performed by: FAMILY MEDICINE

## 2018-02-02 PROCEDURE — 99214 OFFICE O/P EST MOD 30 MIN: CPT | Mod: S$PBB,,, | Performed by: FAMILY MEDICINE

## 2018-02-02 RX ORDER — HYDROCODONE BITARTRATE AND ACETAMINOPHEN 5; 325 MG/1; MG/1
1 TABLET ORAL EVERY 6 HOURS PRN
Qty: 120 TABLET | Refills: 0 | Status: SHIPPED | OUTPATIENT
Start: 2018-02-02 | End: 2018-03-06 | Stop reason: SDUPTHER

## 2018-02-02 RX ORDER — ERGOCALCIFEROL 1.25 MG/1
50000 CAPSULE ORAL
Qty: 12 CAPSULE | Refills: 1 | Status: SHIPPED | OUTPATIENT
Start: 2018-02-02 | End: 2018-07-28 | Stop reason: SDUPTHER

## 2018-02-02 NOTE — LETTER
"2018    Joceline Holcomb  [address] [facility] [place] [address]             [address] [facility] [place] [address]  Phone: [phone]  Fax: [phone]   2018    Re: Joceline Holcomb        : 1950        MRN: [id number]    PAIN ORIENTATION AGREEMENT    My doctor and I have decided that as part of my treatment for chronic pain, I will receive prescriptions for controlled substances.  As a patient, I will agree to the following terms in order for my provider to effectively treat my pain and also comply with the rules set forth by [facility] Board of Medical Examiners and Drug Enforcement Agency.  1. I understand that in order for me to receive the best possible care, my pain management doctor needs a copy of any previous medical records, including MRI, office notes, lab results, etc.  2. I will provide a full list of my medications, current dose, and how often I take my medication.  3. A single physician shall be responsible for prescribing my pain medication.  4. I understand that my physician may require an office visit every 3 months for certain controlled substances.  5. It is my responsibility to keep my appointments.  If I miss my schedule appointment, I will be rescheduled to the first available time slot.  I understand that pain medications may not be refilled until seen.  6. If my doctor agrees to refill my pain medication by telephone, I will call the office at least 5 business days in advance to request a refill prescription.  7. Refill prescriptions will not be written in the evenings, weekends, or on holidays.  8. Each prescription is expected to last at least one month.  Refills will not be given early if I "run out early", "lose a prescription", "spill" or "misplaced" my medication.  9. It may be necessary for prescriptions to be picked up in person and proof of identification may be required.  10. I will have my pain medication " "filled at only one pharmacy.                 [facility] - [place], [place] - [address] AT [address]  [address]  [place] [address]  Phone: [phone] Fax: [phone]         11. A baseline drug screen may be completed on my first visit and or randomly at other routine clinic visits.      I have read and understand the above information.  I will, to the best of my ability, adhere to these policies and commitments.  I further understand that noncompliance with these policies may result in my being discharged as the patient.      _____________________                     _____Joceline Holcomb______  Patient signature/date         Patient printed  name                                                                                  _____________________                    ____________________  Physician signature/printed name/date         Witness/date    Jacki Rivero MD 2/2/2018                BEHAVIOR AGREEMENT FOR THE USE OF CONTROLLED DRUGS  The following has been explained to me:  1. It is possible that I may become physically dependent, psychologically dependent, tolerant and/or addicted to controlled substance medications.   2. Physical dependence occurs if withdrawal symptoms are experienced when the drug is suddenly discontinued.  3. Tolerance is the need for higher doses of the drug to achieve the same amount of pain control.  4. Addition is a psychological and behavioral syndrome that is recognized when the patient abuses the drug to obtain mental numbness or euphoria (get high), or shows drug craving behavior or manipulative attitude toward the physician in order to obtain the drug.  5. Withdrawal symptoms may occur if pain medication is stopped abruptly.   These symptoms include yawning, sweating, watery eyes, runny nose, anxiety, tremors, achy muscles, hot and cold flashes, "gooseflesh ", abdominal cramps or diarrhea.  6. I will not cut or chew long-acting " pain medication.  7. If severe sedation (sleepiness) or any other medical emergency relating to my pain medication occurs, I will contact my doctor's office or seek ER attention immediately.  8. I will not combine these drugs with alcohol or recreational drugs (this includes marijuana).     9. I must inform my doctor if I am taking any other sedating drugs such as Valium, Ativan, seizure medication or psychiatric drugs.    10. I will inform my physician of any current or prior history of drug abuse or prescription medication misuse.  11. These medications may be harmful to an unborn child.  I have been advised to use 2 forms of birth control (at least one barrier, such as condoms) while using these medications.    12. If I test positive for drugs that my doctor has not prescribed, and/or if I refuses a random drug test, my physician has the right to stop my controlled substance, end  his/her relationship with me, and I may be terminated from the clinic.   13. If at any time I become violent or abusive, verbally or physically, my actions will be considered cause to terminate care from the clinic and discontinuation of pain medications.          I understand and agree that if I fail to abide by the above agreements, or if I show signs suspicious of narcotic over use or abuse, my pain management physician may discontinue treatment, and narcotic prescriptions will be discontinued.            _____________________                     _____Joceline Holcomb______  Patient signature/date          Patient printed  name                                                                                _____________________                    ____________________  Physician signature/printed name/date           Witness/date    Jacki Rivero MD 2/2/2018

## 2018-02-02 NOTE — PROGRESS NOTES
Subjective:       Patient ID: Joceline Holcomb is a 67 y.o. female.    Chief Complaint: Follow-up    Patient Active Problem List   Diagnosis    Rheumatoid arthritis involving both hands with negative rheumatoid factor    Osteoporosis    Hypertension, well controlled    Long term methotrexate user    Vitamin D deficiency    Marijuana use    Compression fracture of vertebra with routine healing 11/17   reviewed recent labs  Lab Visit on 01/22/2018   Component Date Value Ref Range Status    Sodium 01/22/2018 141  136 - 145 mmol/L Final    Potassium 01/22/2018 4.3  3.5 - 5.1 mmol/L Final    Chloride 01/22/2018 104  95 - 110 mmol/L Final    CO2 01/22/2018 29  23 - 29 mmol/L Final    Glucose 01/22/2018 96  70 - 110 mg/dL Final    BUN, Bld 01/22/2018 20  8 - 23 mg/dL Final    Creatinine 01/22/2018 1.1  0.5 - 1.4 mg/dL Final    Calcium 01/22/2018 9.8  8.7 - 10.5 mg/dL Final    Total Protein 01/22/2018 6.8  6.0 - 8.4 g/dL Final    Albumin 01/22/2018 3.7  3.5 - 5.2 g/dL Final    Total Bilirubin 01/22/2018 0.6  0.1 - 1.0 mg/dL Final    Alkaline Phosphatase 01/22/2018 120  55 - 135 U/L Final    AST 01/22/2018 27  10 - 40 U/L Final    ALT 01/22/2018 47* 10 - 44 U/L Final    Anion Gap 01/22/2018 8  8 - 16 mmol/L Final    eGFR if  01/22/2018 59.2* >60 mL/min/1.73 m^2 Final    eGFR if non African American 01/22/2018 51.3* >60 mL/min/1.73 m^2 Final    WBC 01/22/2018 5.24  3.90 - 12.70 K/uL Final    RBC 01/22/2018 4.18  4.00 - 5.40 M/uL Final    Hemoglobin 01/22/2018 12.9  12.0 - 16.0 g/dL Final    Hematocrit 01/22/2018 40.1  37.0 - 48.5 % Final    MCV 01/22/2018 96  82 - 98 fL Final    MCH 01/22/2018 30.9  27.0 - 31.0 pg Final    MCHC 01/22/2018 32.2  32.0 - 36.0 g/dL Final    RDW 01/22/2018 16.0* 11.5 - 14.5 % Final    Platelets 01/22/2018 292  150 - 350 K/uL Final    MPV 01/22/2018 10.8  9.2 - 12.9 fL Final    Immature Granulocytes 01/22/2018 0.4  0.0 - 0.5 % Final    Gran #  (ANC) 01/22/2018 3.1  1.8 - 7.7 K/uL Final    Immature Grans (Abs) 01/22/2018 0.02  0.00 - 0.04 K/uL Final    Lymph # 01/22/2018 1.5  1.0 - 4.8 K/uL Final    Mono # 01/22/2018 0.4  0.3 - 1.0 K/uL Final    Eos # 01/22/2018 0.1  0.0 - 0.5 K/uL Final    Baso # 01/22/2018 0.08  0.00 - 0.20 K/uL Final    nRBC 01/22/2018 0  0 /100 WBC Final    Gran% 01/22/2018 59.9  38.0 - 73.0 % Final    Lymph% 01/22/2018 29.0  18.0 - 48.0 % Final    Mono% 01/22/2018 7.3  4.0 - 15.0 % Final    Eosinophil% 01/22/2018 1.9  0.0 - 8.0 % Final    Basophil% 01/22/2018 1.5  0.0 - 1.9 % Final    Differential Method 01/22/2018 Automated   Final    Cholesterol 01/22/2018 217* 120 - 199 mg/dL Final    Triglycerides 01/22/2018 82  30 - 150 mg/dL Final    HDL 01/22/2018 64  40 - 75 mg/dL Final    LDL Cholesterol 01/22/2018 136.6  63.0 - 159.0 mg/dL Final    HDL/Chol Ratio 01/22/2018 29.5  20.0 - 50.0 % Final    Total Cholesterol/HDL Ratio 01/22/2018 3.4  2.0 - 5.0 Final    Non-HDL Cholesterol 01/22/2018 153  mg/dL Final    Vit D, 25-Hydroxy 01/22/2018 22* 30 - 96 ng/mL Final     Was reaching down to turn on heater Thanksgiving morning and felt crack and pop and had suffered a compression fracture. Has been seen by back specialist and balloon kyphoplasty offered but declined.  Has been doing PT and wearing rigid back support for prolonged sitting and standing. PT finished last week but doing home exercises.  Pain ranges from 4-10/10.      Under care of Dr. Barrett Rheum.  Needs dexa and last prolia was over 1 year ago.      Declined mammo and colonoscopy    HPI  Review of Systems   Constitutional: Negative for fatigue and unexpected weight change.   Respiratory: Negative for chest tightness and shortness of breath.    Cardiovascular: Negative for chest pain, palpitations and leg swelling.   Gastrointestinal: Negative for abdominal pain.   Musculoskeletal: Negative for arthralgias.   Neurological: Negative for dizziness, syncope,  light-headedness and headaches.       Objective:      Physical Exam   Constitutional: She is oriented to person, place, and time. She appears well-developed and well-nourished.   Cardiovascular: Normal rate, regular rhythm and normal heart sounds.    Pulmonary/Chest: Effort normal and breath sounds normal.   Musculoskeletal: She exhibits no edema.   Neurological: She is alert and oriented to person, place, and time.   Skin: Skin is warm and dry.   Psychiatric: She has a normal mood and affect.   Nursing note and vitals reviewed.      Assessment:       1. Osteoporosis, unspecified osteoporosis type, unspecified pathological fracture presence    2. Encounter for FIT (fecal immunochemical test) screening    3. Vitamin D deficiency    4. Chronic pain disorder    5. Marijuana use    6. Hypertension, well controlled    7. Rheumatoid arthritis involving both hands with negative rheumatoid factor    8. Long term methotrexate user    9. Compression fracture of vertebra with routine healing 11/17        Plan:       1. Osteoporosis, unspecified osteoporosis type, unspecified pathological fracture presence  Screen and treat as indicated:    - DXA Bone Density Spine And Hip; Future    2. Encounter for FIT (fecal immunochemical test) screening  Patient declines a colonoscopy after discussing benefits and risks. Patient is willing to do FOBT x3  or FIT test at home understanding it is 4 times less effective at detecting cancers/masses/polyps than a screening colonoscopy    - Fecal Immunochemical Test (iFOBT); Future    3. Vitamin D deficiency  Screen and treat as indicated:    - ergocalciferol (ERGOCALCIFEROL) 50,000 unit Cap; Take 1 capsule (50,000 Units total) by mouth every 7 days.  Dispense: 12 capsule; Refill: 1    4. Chronic pain disorder  Controlled on current medications.  Continue current medications.  Counseled patient on habit forming potential of opiates, risk of sedation, respiratory suppression or arrest especially  if used in conjunction with benzodiazepines or alcohol.  Counseled patient to avoid driving while on the medication, rules of the pain contract and need for routine 3 month follow ups.  Patient agrees to all aspects of the plan of care and wishes to proceed with therapy.  The plan is always to use alternatives less habit forming, to utilize interventions and therapies that reduce pain as an adjunctive treatment, and limit and wean the dose of narcotics as soon as able and appropriate.      - hydrocodone-acetaminophen 5-325mg (NORCO) 5-325 mg per tablet; Take 1 tablet by mouth every 6 (six) hours as needed for Pain.  Dispense: 120 tablet; Refill: 0    5. Marijuana use  Avoid     6. Hypertension, well controlled  Controlled on current medications.  Continue current medications.      7. Rheumatoid arthritis involving both hands with negative rheumatoid factor  Cont rheum care    8. Long term methotrexate user  Cotn monitoring    9. Compression fracture of vertebra with routine healing 11/17  Cont back brace use for proglonged sitting /standing.      Reeval 3 months or sooner prn

## 2018-02-17 DIAGNOSIS — M06.042 RHEUMATOID ARTHRITIS INVOLVING BOTH HANDS WITH NEGATIVE RHEUMATOID FACTOR: ICD-10-CM

## 2018-02-17 DIAGNOSIS — M06.041 RHEUMATOID ARTHRITIS INVOLVING BOTH HANDS WITH NEGATIVE RHEUMATOID FACTOR: ICD-10-CM

## 2018-02-17 RX ORDER — METHOTREXATE 2.5 MG/1
TABLET ORAL
Qty: 20 TABLET | Refills: 0 | Status: SHIPPED | OUTPATIENT
Start: 2018-02-17 | End: 2018-03-19 | Stop reason: SDUPTHER

## 2018-02-23 ENCOUNTER — LAB VISIT (OUTPATIENT)
Dept: LAB | Facility: HOSPITAL | Age: 68
End: 2018-02-23
Attending: FAMILY MEDICINE
Payer: MEDICARE

## 2018-02-23 DIAGNOSIS — Z12.11 ENCOUNTER FOR FIT (FECAL IMMUNOCHEMICAL TEST) SCREENING: ICD-10-CM

## 2018-02-23 LAB — HEMOCCULT STL QL IA: NEGATIVE

## 2018-02-23 PROCEDURE — 82274 ASSAY TEST FOR BLOOD FECAL: CPT

## 2018-03-06 DIAGNOSIS — G89.4 CHRONIC PAIN DISORDER: ICD-10-CM

## 2018-03-06 RX ORDER — HYDROCODONE BITARTRATE AND ACETAMINOPHEN 5; 325 MG/1; MG/1
1 TABLET ORAL EVERY 6 HOURS PRN
Qty: 120 TABLET | Refills: 0 | Status: SHIPPED | OUTPATIENT
Start: 2018-03-06 | End: 2018-04-05 | Stop reason: SDUPTHER

## 2018-03-19 ENCOUNTER — HOSPITAL ENCOUNTER (OUTPATIENT)
Dept: RADIOLOGY | Facility: HOSPITAL | Age: 68
Discharge: HOME | End: 2018-03-19
Attending: FAMILY MEDICINE
Payer: MEDICARE

## 2018-03-19 ENCOUNTER — OFFICE VISIT (OUTPATIENT)
Dept: RHEUMATOLOGY | Facility: CLINIC | Age: 68
End: 2018-03-19
Payer: MEDICARE

## 2018-03-19 ENCOUNTER — TELEPHONE (OUTPATIENT)
Dept: RHEUMATOLOGY | Facility: CLINIC | Age: 68
End: 2018-03-19

## 2018-03-19 VITALS
HEART RATE: 74 BPM | DIASTOLIC BLOOD PRESSURE: 84 MMHG | WEIGHT: 109.38 LBS | HEIGHT: 60 IN | BODY MASS INDEX: 21.47 KG/M2 | SYSTOLIC BLOOD PRESSURE: 144 MMHG

## 2018-03-19 DIAGNOSIS — M81.0 AGE-RELATED OSTEOPOROSIS WITHOUT CURRENT PATHOLOGICAL FRACTURE: ICD-10-CM

## 2018-03-19 DIAGNOSIS — M81.0 OSTEOPOROSIS, UNSPECIFIED OSTEOPOROSIS TYPE, UNSPECIFIED PATHOLOGICAL FRACTURE PRESENCE: ICD-10-CM

## 2018-03-19 DIAGNOSIS — M06.042 RHEUMATOID ARTHRITIS INVOLVING BOTH HANDS WITH NEGATIVE RHEUMATOID FACTOR: Primary | ICD-10-CM

## 2018-03-19 DIAGNOSIS — Z79.631 LONG TERM METHOTREXATE USER: ICD-10-CM

## 2018-03-19 DIAGNOSIS — M06.041 RHEUMATOID ARTHRITIS INVOLVING BOTH HANDS WITH NEGATIVE RHEUMATOID FACTOR: Primary | ICD-10-CM

## 2018-03-19 PROCEDURE — 99213 OFFICE O/P EST LOW 20 MIN: CPT | Mod: PBBFAC,25,PO | Performed by: INTERNAL MEDICINE

## 2018-03-19 PROCEDURE — 77081 DXA BONE DENSITY APPENDICULR: CPT | Mod: TC

## 2018-03-19 PROCEDURE — 77081 DXA BONE DENSITY APPENDICULR: CPT | Mod: 26,,, | Performed by: RADIOLOGY

## 2018-03-19 PROCEDURE — 99999 PR PBB SHADOW E&M-EST. PATIENT-LVL III: CPT | Mod: PBBFAC,,, | Performed by: INTERNAL MEDICINE

## 2018-03-19 PROCEDURE — 99214 OFFICE O/P EST MOD 30 MIN: CPT | Mod: S$PBB,,, | Performed by: INTERNAL MEDICINE

## 2018-03-19 PROCEDURE — 77080 DXA BONE DENSITY AXIAL: CPT | Mod: 26,59,, | Performed by: RADIOLOGY

## 2018-03-19 PROCEDURE — 77080 DXA BONE DENSITY AXIAL: CPT | Mod: TC

## 2018-03-19 RX ORDER — METHOTREXATE 2.5 MG/1
10 TABLET ORAL
Qty: 20 TABLET | Refills: 3 | Status: SHIPPED | OUTPATIENT
Start: 2018-03-19 | End: 2018-08-01 | Stop reason: SDUPTHER

## 2018-03-19 RX ORDER — FOLIC ACID 1 MG/1
1 TABLET ORAL DAILY
Qty: 90 TABLET | Refills: 0 | Status: SHIPPED | OUTPATIENT
Start: 2018-03-19 | End: 2018-10-20 | Stop reason: SDUPTHER

## 2018-03-19 ASSESSMENT — ROUTINE ASSESSMENT OF PATIENT INDEX DATA (RAPID3)
WHEN YOU AWAKENED IN THE MORNING OVER THE LAST WEEK, PLEASE INDICATE THE AMOUNT OF TIME IT TAKES UNTIL YOU ARE AS LIMBER AS YOU WILL BE FOR THE DAY: 15-20 MIN
PATIENT GLOBAL ASSESSMENT SCORE: 5.5
FATIGUE SCORE: 5
TOTAL RAPID3 SCORE: 5.39
PSYCHOLOGICAL DISTRESS SCORE: 2.2
PAIN SCORE: 6
MDHAQ FUNCTION SCORE: 1.4
AM STIFFNESS SCORE: 1, YES

## 2018-03-19 ASSESSMENT — DISEASE ACTIVITY SCORE (DAS28)
SWOLLEN_JOINTS_COUNT: 0
TENDER_JOINTS_COUNT: 0
TOTAL_SCORE_ESR: 1.33
GLOBAL_HEALTH_SCORE: 5.5
ESR_MM_PER_HR: 6

## 2018-03-19 NOTE — PROGRESS NOTES
Subjective:       Patient ID: Joceline Holcomb is a 67 y.o. female.    Chief Complaint: Seronegative Rheumatoid arthritis  HPI 67-year-old female is here for follow up for seronegative rheumatoid arthritis.   Currently on methotrexate 10 mg a week.  Plaquenil discontinued secondary to palmar rash. In the past she has taken sulfasalazine (not effective) as well as prednisone.    Also on on INH and pyridoxine for latent TB.  Tolerating medications without any side effects. Awaiting ID f/u apt    Denies any new joint pain or swelling.   Awaiting DEXA results. Prolia not covered by insurance. Denies any new fractures  She denies fever, weight loss, dry eyes or mouth, photosensitivity, ulcer, raynaud's phenomenon, alopecia, dysphagia, diarrhea or blood in the stools    Review of Systems   HENT: Negative for facial swelling.    Eyes: Negative for pain and redness.   Respiratory: Negative for cough and shortness of breath.    Cardiovascular: Negative for chest pain and leg swelling.   Gastrointestinal: Negative for abdominal distention and abdominal pain.   Musculoskeletal: Negative for arthralgias and gait problem.   Neurological: Negative for tremors and seizures.   Psychiatric/Behavioral: Negative for agitation and behavioral problems.         Objective:     BP (!) 144/84 (BP Location: Right arm, Patient Position: Sitting)   Pulse 74   Ht 5' (1.524 m)   Wt 49.6 kg (109 lb 5.6 oz)   BMI 21.36 kg/m²      Physical Exam   Constitutional: She is oriented to person, place, and time and well-developed, well-nourished, and in no distress.   HENT:   Head: Normocephalic and atraumatic.   Eyes: Conjunctivae are normal. Pupils are equal, round, and reactive to light.   Neck: Neck supple.   Cardiovascular: Normal rate and regular rhythm.    Pulmonary/Chest: Effort normal and breath sounds normal.   Abdominal: Soft. Bowel sounds are normal.       Right Side Rheumatological Exam     Examination finds the shoulder, elbow, wrist,  knee, 1st PIP, 1st MCP, 2nd PIP, 2nd MCP, 3rd PIP, 3rd MCP, 4th PIP, 4th MCP, 5th PIP and 5th MCP normal.    Left Side Rheumatological Exam     Examination finds the shoulder, elbow, wrist, knee, 1st PIP, 1st MCP, 2nd PIP, 2nd MCP, 3rd PIP, 3rd MCP, 4th PIP, 4th MCP, 5th PIP and 5th MCP normal.      Neurological: She is alert and oriented to person, place, and time.   Skin: Skin is warm and dry. No rash noted.     Psychiatric: Mood and affect normal.   Musculoskeletal:                  Lab Results   Component Value Date    WBC 5.24 01/22/2018    HGB 12.9 01/22/2018    HCT 40.1 01/22/2018    MCV 96 01/22/2018     01/22/2018     CMP  Sodium   Date Value Ref Range Status   01/22/2018 141 136 - 145 mmol/L Final     Potassium   Date Value Ref Range Status   01/22/2018 4.3 3.5 - 5.1 mmol/L Final     Chloride   Date Value Ref Range Status   01/22/2018 104 95 - 110 mmol/L Final     CO2   Date Value Ref Range Status   01/22/2018 29 23 - 29 mmol/L Final     Glucose   Date Value Ref Range Status   01/22/2018 96 70 - 110 mg/dL Final     BUN, Bld   Date Value Ref Range Status   01/22/2018 20 8 - 23 mg/dL Final     Creatinine   Date Value Ref Range Status   01/22/2018 1.1 0.5 - 1.4 mg/dL Final     Calcium   Date Value Ref Range Status   01/22/2018 9.8 8.7 - 10.5 mg/dL Final     Total Protein   Date Value Ref Range Status   01/22/2018 6.8 6.0 - 8.4 g/dL Final     Albumin   Date Value Ref Range Status   01/22/2018 3.7 3.5 - 5.2 g/dL Final     Total Bilirubin   Date Value Ref Range Status   01/22/2018 0.6 0.1 - 1.0 mg/dL Final     Comment:     For infants and newborns, interpretation of results should be based  on gestational age, weight and in agreement with clinical  observations.  Premature Infant recommended reference ranges:  Up to 24 hours.............<8.0 mg/dL  Up to 48 hours............<12.0 mg/dL  3-5 days..................<15.0 mg/dL  6-29 days.................<15.0 mg/dL       Alkaline Phosphatase   Date Value Ref  Range Status   01/22/2018 120 55 - 135 U/L Final     AST   Date Value Ref Range Status   01/22/2018 27 10 - 40 U/L Final     ALT   Date Value Ref Range Status   01/22/2018 47 (H) 10 - 44 U/L Final     Anion Gap   Date Value Ref Range Status   01/22/2018 8 8 - 16 mmol/L Final     eGFR if    Date Value Ref Range Status   01/22/2018 59.2 (A) >60 mL/min/1.73 m^2 Final     eGFR if non    Date Value Ref Range Status   01/22/2018 51.3 (A) >60 mL/min/1.73 m^2 Final     Comment:     Calculation used to obtain the estimated glomerular filtration  rate (eGFR) is the CKD-EPI equation.        Lab Results   Component Value Date    SEDRATE 8 07/03/2017     Lab Results   Component Value Date    CRP 3.4 07/03/2017       Assessment:   Seronegative  rheumatoid arthritis DAS28 1.33   Latent TB-on INH and pyridoxine   Scaly rash on palms-?  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 ? For 3 years  Osteoporosis managed by Dr Johanson in the past. Await DEXA   Osteoarthritis of bilateral knees  Long-term use of methotrexate  Plan:   Cont methotrexate 10 mg per week  Continue folic acid 1 mg a day  Await DEXA- Will consider restarting Prolia vs BPs   F/U with ID   RTC in 3-4 months with labs

## 2018-03-19 NOTE — TELEPHONE ENCOUNTER
Spoke to pt states someone called and advised pt of bone density results and informed pt to add 2 new medications. Advised pt she believes she called the wrong 's office as they were calling about a bone density test. Advised pt I spoke to her regarding her labs being stable. Pt states she will try to contact Dr. Rivero office      ----- Message -----  From: Nettie Lentz  Sent: 3/19/2018   3:19 PM  To: Josefa WATKINS Staff    Pt states that she needs to speak to Nurse Britta regarding phone call received by the nurse telling her that she needs to take vitamin D,the pt states already taking 50,000 and does she need to be taking more...[phone] (home)

## 2018-03-19 NOTE — TELEPHONE ENCOUNTER
----- Message from Rhonda Barrett MD sent at 3/19/2018  1:36 PM CDT -----  Aida,   Please let the patient know that labs are stable. Thanks SS

## 2018-03-22 ENCOUNTER — TELEPHONE (OUTPATIENT)
Dept: FAMILY MEDICINE | Facility: CLINIC | Age: 68
End: 2018-03-22

## 2018-03-22 NOTE — TELEPHONE ENCOUNTER
Spoke to patient and relayed the information given by the nurse. Verbalized understanding and will keep her follow up appointment.

## 2018-03-22 NOTE — TELEPHONE ENCOUNTER
----- Message from Karley [place] sent at 3/19/2018  3:36 PM CDT -----  Contact: Patient  Patient returned call. She stated she received a call about taking 200 units Vitamin D but she is already taking 5000 units of Vitamin D. She wants to make sure it's not a different Vitamin D she should be taking. She can be contacted at [phone].    Thanks,  Karley

## 2018-03-23 DIAGNOSIS — M06.041 RHEUMATOID ARTHRITIS INVOLVING BOTH HANDS WITH NEGATIVE RHEUMATOID FACTOR: ICD-10-CM

## 2018-03-23 DIAGNOSIS — M06.042 RHEUMATOID ARTHRITIS INVOLVING BOTH HANDS WITH NEGATIVE RHEUMATOID FACTOR: ICD-10-CM

## 2018-03-23 RX ORDER — METHOTREXATE 2.5 MG/1
TABLET ORAL
Qty: 20 TABLET | Refills: 2 | Status: SHIPPED | OUTPATIENT
Start: 2018-03-23 | End: 2018-05-23

## 2018-03-26 DIAGNOSIS — Z22.7 LATENT TUBERCULOSIS BY BLOOD TEST: ICD-10-CM

## 2018-03-26 RX ORDER — ISONIAZID 300 MG/1
TABLET ORAL
Qty: 90 TABLET | Refills: 0 | OUTPATIENT
Start: 2018-03-26

## 2018-04-02 ENCOUNTER — OFFICE VISIT (OUTPATIENT)
Dept: INFECTIOUS DISEASES | Facility: CLINIC | Age: 68
End: 2018-04-02
Payer: MEDICARE

## 2018-04-02 VITALS
HEIGHT: 60 IN | TEMPERATURE: 98 F | WEIGHT: 105.81 LBS | SYSTOLIC BLOOD PRESSURE: 108 MMHG | HEART RATE: 115 BPM | BODY MASS INDEX: 20.77 KG/M2 | DIASTOLIC BLOOD PRESSURE: 77 MMHG

## 2018-04-02 DIAGNOSIS — Z22.7 LATENT TUBERCULOSIS BY BLOOD TEST: ICD-10-CM

## 2018-04-02 PROCEDURE — 99999 PR PBB SHADOW E&M-EST. PATIENT-LVL III: CPT | Mod: PBBFAC,,, | Performed by: INTERNAL MEDICINE

## 2018-04-02 PROCEDURE — 99213 OFFICE O/P EST LOW 20 MIN: CPT | Mod: PBBFAC | Performed by: INTERNAL MEDICINE

## 2018-04-02 PROCEDURE — 99212 OFFICE O/P EST SF 10 MIN: CPT | Mod: S$PBB,,, | Performed by: INTERNAL MEDICINE

## 2018-04-02 NOTE — PROGRESS NOTES
Subjective:      Patient ID: Joceline Holcomb is a 67 y.o. female.    Chief Complaint:    F/U on rx for latent TB      History of Present Illness    Pt has completed 1 year INH and B6. No need for additional rx    Review of Systems   Constitution: Negative for chills, decreased appetite, fever, weakness, malaise/fatigue, night sweats, weight gain and weight loss.   HENT: Negative for congestion, ear pain, hearing loss, hoarse voice, sore throat and tinnitus.    Eyes: Negative for blurred vision, redness and visual disturbance.   Cardiovascular: Negative for chest pain, leg swelling and palpitations.   Respiratory: Negative for cough, hemoptysis, shortness of breath and sputum production.    Hematologic/Lymphatic: Negative for adenopathy. Bruises/bleeds easily.   Skin: Positive for rash. Negative for dry skin, itching and suspicious lesions.   Musculoskeletal: Positive for back pain, joint pain, myalgias and neck pain.   Gastrointestinal: Positive for hematochezia. Negative for abdominal pain, constipation, diarrhea, heartburn, nausea and vomiting.   Genitourinary: Negative for dysuria, flank pain, frequency, hematuria, hesitancy and urgency.   Neurological: Positive for headaches. Negative for dizziness, numbness and paresthesias.   Psychiatric/Behavioral: Negative for depression and memory loss. The patient has insomnia. The patient is not nervous/anxious.      Objective:   Physical Exam   Constitutional: She is oriented to person, place, and time. She appears well-developed and well-nourished. No distress.   Neurological: She is alert and oriented to person, place, and time.   Vitals reviewed.    Assessment:       1. Latent tuberculosis by blood test          Plan:        see HPI

## 2018-04-05 DIAGNOSIS — G89.4 CHRONIC PAIN DISORDER: ICD-10-CM

## 2018-04-05 RX ORDER — HYDROCODONE BITARTRATE AND ACETAMINOPHEN 5; 325 MG/1; MG/1
1 TABLET ORAL EVERY 6 HOURS PRN
Qty: 120 TABLET | Refills: 0 | Status: SHIPPED | OUTPATIENT
Start: 2018-04-05 | End: 2018-05-03 | Stop reason: SDUPTHER

## 2018-04-05 NOTE — TELEPHONE ENCOUNTER
----- Message from Latasha Fairbanks sent at 4/5/2018  9:10 AM CDT -----  Contact: patient  Patient called requesting refill on hydrocodone-acetaminophen 5-325mg (NORCO) 5-325 mg per tablet send to [facility] pharmacy. Call back at [phone] when script has been sent. Thanks,

## 2018-05-01 RX ORDER — FOLIC ACID 1 MG/1
TABLET ORAL
Qty: 90 TABLET | Refills: 3 | Status: SHIPPED | OUTPATIENT
Start: 2018-05-01 | End: 2018-05-23 | Stop reason: SDUPTHER

## 2018-05-03 DIAGNOSIS — G89.4 CHRONIC PAIN DISORDER: ICD-10-CM

## 2018-05-04 RX ORDER — HYDROCODONE BITARTRATE AND ACETAMINOPHEN 5; 325 MG/1; MG/1
1 TABLET ORAL EVERY 6 HOURS PRN
Qty: 120 TABLET | Refills: 0 | Status: SHIPPED | OUTPATIENT
Start: 2018-05-04 | End: 2018-06-05 | Stop reason: SDUPTHER

## 2018-05-21 DIAGNOSIS — Z12.39 SCREENING FOR BREAST CANCER: Primary | ICD-10-CM

## 2018-05-23 ENCOUNTER — OFFICE VISIT (OUTPATIENT)
Dept: FAMILY MEDICINE | Facility: CLINIC | Age: 68
End: 2018-05-23
Payer: MEDICARE

## 2018-05-23 ENCOUNTER — HOSPITAL ENCOUNTER (OUTPATIENT)
Dept: RADIOLOGY | Facility: CLINIC | Age: 68
Discharge: HOME | End: 2018-05-23
Attending: FAMILY MEDICINE
Payer: MEDICARE

## 2018-05-23 VITALS
SYSTOLIC BLOOD PRESSURE: 106 MMHG | WEIGHT: 106.69 LBS | TEMPERATURE: 98 F | DIASTOLIC BLOOD PRESSURE: 64 MMHG | HEIGHT: 60 IN | HEART RATE: 79 BPM | BODY MASS INDEX: 20.94 KG/M2

## 2018-05-23 DIAGNOSIS — Z12.31 ENCOUNTER FOR SCREENING MAMMOGRAM FOR MALIGNANT NEOPLASM OF BREAST: ICD-10-CM

## 2018-05-23 DIAGNOSIS — Z79.631 LONG TERM METHOTREXATE USER: ICD-10-CM

## 2018-05-23 DIAGNOSIS — E55.9 VITAMIN D DEFICIENCY: ICD-10-CM

## 2018-05-23 DIAGNOSIS — F11.20 OPIOID TYPE DEPENDENCE, CONTINUOUS USE: ICD-10-CM

## 2018-05-23 DIAGNOSIS — M81.0 AGE-RELATED OSTEOPOROSIS WITHOUT CURRENT PATHOLOGICAL FRACTURE: ICD-10-CM

## 2018-05-23 DIAGNOSIS — I10 HYPERTENSION, WELL CONTROLLED: Primary | ICD-10-CM

## 2018-05-23 DIAGNOSIS — E78.00 HYPERCHOLESTEREMIA: ICD-10-CM

## 2018-05-23 DIAGNOSIS — M06.041 RHEUMATOID ARTHRITIS INVOLVING BOTH HANDS WITH NEGATIVE RHEUMATOID FACTOR: ICD-10-CM

## 2018-05-23 DIAGNOSIS — M06.042 RHEUMATOID ARTHRITIS INVOLVING BOTH HANDS WITH NEGATIVE RHEUMATOID FACTOR: ICD-10-CM

## 2018-05-23 LAB
AMPHETAMINE, QUAL, UR: NEGATIVE
COCAINE (METABOLITE), QUAL, UR: NEGATIVE
METHAMPHETAMINE, URINE SCREEN: NEGATIVE
OPIATE SCREEN, URINE: NEGATIVE
THC, UR: NEGATIVE

## 2018-05-23 PROCEDURE — 77063 BREAST TOMOSYNTHESIS BI: CPT | Mod: 26,,, | Performed by: RADIOLOGY

## 2018-05-23 PROCEDURE — 77067 SCR MAMMO BI INCL CAD: CPT | Mod: 26,,, | Performed by: RADIOLOGY

## 2018-05-23 PROCEDURE — 99213 OFFICE O/P EST LOW 20 MIN: CPT | Mod: PBBFAC,PO | Performed by: FAMILY MEDICINE

## 2018-05-23 PROCEDURE — 99999 PR PBB SHADOW E&M-EST. PATIENT-LVL III: CPT | Mod: PBBFAC,,, | Performed by: FAMILY MEDICINE

## 2018-05-23 PROCEDURE — 99214 OFFICE O/P EST MOD 30 MIN: CPT | Mod: S$PBB,,, | Performed by: FAMILY MEDICINE

## 2018-05-23 PROCEDURE — 80305 DRUG TEST PRSMV DIR OPT OBS: CPT | Mod: PBBFAC,PO | Performed by: FAMILY MEDICINE

## 2018-05-23 PROCEDURE — 80307 DRUG TEST PRSMV CHEM ANLYZR: CPT

## 2018-05-23 PROCEDURE — 77067 SCR MAMMO BI INCL CAD: CPT | Mod: TC,PO

## 2018-05-23 NOTE — PROGRESS NOTES
Subjective:       Patient ID: Joceline Holcomb is a 67 y.o. female.    Chief Complaint: Follow-up    HPI  Review of Systems   Constitutional: Negative for fatigue and unexpected weight change.   Respiratory: Negative for chest tightness and shortness of breath.    Cardiovascular: Negative for chest pain, palpitations and leg swelling.   Gastrointestinal: Negative for abdominal pain.   Musculoskeletal: Positive for arthralgias.   Neurological: Negative for dizziness, syncope, light-headedness and headaches.       Patient Active Problem List   Diagnosis    Rheumatoid arthritis involving both hands with negative rheumatoid factor    Osteoporosis    Hypertension, well controlled    Long term methotrexate user    Vitamin D deficiency    Marijuana use    Compression fracture of vertebra with routine healing 11/17    Latent tuberculosis by blood test    Opioid type dependence, continuous use -uds 7/3/17 and contract 2/8/18     Patient is here for a chronic conditions follow up.    No visits with results within 1 Month(s) from this visit.   Latest known visit with results is:   Lab Visit on 03/19/2018   Component Date Value Ref Range Status    WBC 03/19/2018 5.80  3.90 - 12.70 K/uL Final    RBC 03/19/2018 4.37  4.00 - 5.40 M/uL Final    Hemoglobin 03/19/2018 13.6  12.0 - 16.0 g/dL Final    Hematocrit 03/19/2018 41.1  37.0 - 48.5 % Final    MCV 03/19/2018 94  82 - 98 fL Final    MCH 03/19/2018 31.2* 27.0 - 31.0 pg Final    MCHC 03/19/2018 33.1  32.0 - 36.0 g/dL Final    RDW 03/19/2018 14.6* 11.5 - 14.5 % Final    Platelets 03/19/2018 244  150 - 350 K/uL Final    MPV 03/19/2018 8.2* 9.2 - 12.9 fL Final    Gran # (ANC) 03/19/2018 3.9  1.8 - 7.7 K/uL Final    Lymph # 03/19/2018 1.6  1.0 - 4.8 K/uL Final    Mono # 03/19/2018 0.3  0.3 - 1.0 K/uL Final    Eos # 03/19/2018 0.0  0.0 - 0.5 K/uL Final    Baso # 03/19/2018 0.00  0.00 - 0.20 K/uL Final    Gran% 03/19/2018 66.2  38.0 - 73.0 % Final    Lymph%  03/19/2018 28.2  18.0 - 48.0 % Final    Mono% 03/19/2018 4.8  4.0 - 15.0 % Final    Eosinophil% 03/19/2018 0.7  0.0 - 8.0 % Final    Basophil% 03/19/2018 0.1  0.0 - 1.9 % Final    Differential Method 03/19/2018 Automated   Final    CRP 03/19/2018 7.8  0.0 - 8.2 mg/L Final    Sodium 03/19/2018 144  136 - 145 mmol/L Final    Potassium 03/19/2018 3.8  3.5 - 5.1 mmol/L Final    Chloride 03/19/2018 106  95 - 110 mmol/L Final    CO2 03/19/2018 27  23 - 29 mmol/L Final    Glucose 03/19/2018 94  70 - 110 mg/dL Final    BUN, Bld 03/19/2018 10  8 - 23 mg/dL Final    Creatinine 03/19/2018 0.8  0.5 - 1.4 mg/dL Final    Calcium 03/19/2018 9.9  8.7 - 10.5 mg/dL Final    Total Protein 03/19/2018 6.7  6.0 - 8.4 g/dL Final    Albumin 03/19/2018 3.7  3.5 - 5.2 g/dL Final    Total Bilirubin 03/19/2018 0.5  0.1 - 1.0 mg/dL Final    Alkaline Phosphatase 03/19/2018 108  55 - 135 U/L Final    AST 03/19/2018 20  10 - 40 U/L Final    ALT 03/19/2018 10  10 - 44 U/L Final    Anion Gap 03/19/2018 11  8 - 16 mmol/L Final    eGFR if African American 03/19/2018 >60  >60 mL/min/1.73 m^2 Final    eGFR if non African American 03/19/2018 >60  >60 mL/min/1.73 m^2 Final    Sed Rate 03/19/2018 6  0 - 20 mm/Hr Final     Continuous use of opioids follow-up:  Current medication and dosing:  Hydrocodone 5 qid  Supply:  30 day supply  Side effects: none  Pain controlled: yes  Medication compliance: yes  DPS checked today: yes today no evidence of diversion  UDS: 7/3/17   pain contract signed: yes 12/9/16 and 2/8/18  Opioid risk tool done:no  Objective:      Physical Exam   Constitutional: She is oriented to person, place, and time. She appears well-developed and well-nourished.   Cardiovascular: Normal rate, regular rhythm and normal heart sounds.    Pulmonary/Chest: Effort normal and breath sounds normal.   Musculoskeletal: She exhibits no edema.   Neurological: She is alert and oriented to person, place, and time.   Skin: Skin is  warm and dry.   Psychiatric: She has a normal mood and affect.   Nursing note and vitals reviewed.    Rheum Dr. Barrett    Assessment:       1. Hypertension, well controlled    2. Vitamin D deficiency    3. Rheumatoid arthritis involving both hands with negative rheumatoid factor    4. Age-related osteoporosis without current pathological fracture    5. Long term methotrexate user    6. Compression fracture of vertebra with routine healing 11/17    7. Hypercholesteremia    8. Encounter for screening mammogram for malignant neoplasm of breast    9. Opioid type dependence, continuous use -uds 7/3/17 and contract 2/8/18        Plan:       1. Hypertension, well controlled  Controlled on current medications.  Continue current medications.      2. Vitamin D deficiency  Stable condition.  Continue current medications.  Will adjust based on lab findings or if condition changes.    - Vitamin D; Future    3. Rheumatoid arthritis involving both hands with negative rheumatoid factor  Cont rheum mgmt    4. Age-related osteoporosis without current pathological fracture  Cont rheum mgmt    5. Long term methotrexate user  Cont monitoring    6. Compression fracture of vertebra with routine healing 11/17  Cont pain mgmt    7. Hypercholesteremia  Stable condition.  Continue current medications.  Will adjust based on lab findings or if condition changes.    - CBC auto differential; Future  - Comprehensive metabolic panel; Future  - Lipid panel; Future    8. Encounter for screening mammogram for malignant neoplasm of breast  Screen and treat as indicated:    - Mammo Digital Screening Bilateral With CAD; Future    9. Opioid type dependence, continuous use -uds 7/3/17 and contract 2/8/18  Controlled on current medications.  Continue current medications.  Counseled patient on habit forming potential of opiates, risk of sedation, respiratory suppression or arrest especially if used in conjunction with benzodiazepines or alcohol.  Counseled  patient to avoid driving while on the medication, rules of the pain contract and need for routine 3 month follow ups.  Patient agrees to all aspects of the plan of care and wishes to proceed with therapy.  The plan is always to use alternatives less habit forming, to utilize interventions and therapies that reduce pain as an adjunctive treatment, and limit and wean the dose of narcotics as soon as able and appropriate.    - POCT RAPID DRUG SCREEN        [facility] goal documentation:  Patient readiness: acceptance and barriers:readiness  During the course of the visit the patient was educated and counseled about the following: Hypertension:   Dietary sodium restriction.  Regular aerobic exercise.  Goals: Hypertension: Reduce Blood Pressure  Goal/Outcomes met:Hypertension  The following self management tools provided:none  Patient Instructions (the written plan) was given to the patient/family: Yes  Time spent with patient: 20 minutes    Patient with be reevaluated in3 months with fasting PCL KENISHA Lawrence and me in 6 months or sooner prn    Greater than 50% of this visit was spent counseling as described in above documentation:Yes

## 2018-05-28 LAB
6MAM UR QL: NOT DETECTED
7AMINOCLONAZEPAM UR QL: NOT DETECTED
A-OH ALPRAZ UR QL: NOT DETECTED
ALPRAZ UR QL: NOT DETECTED
AMPHET UR QL SCN: NOT DETECTED
ANNOTATION COMMENT IMP: NORMAL
ANNOTATION COMMENT IMP: NORMAL
BARBITURATES UR QL: NOT DETECTED
BUPRENORPHINE UR QL: NOT DETECTED
BZE UR QL: NOT DETECTED
CARBOXYTHC UR QL: NOT DETECTED
CARISOPRODOL UR QL: NOT DETECTED
CLONAZEPAM UR QL: NOT DETECTED
CODEINE UR QL: NOT DETECTED
CREAT UR-MCNC: 26.9 MG/DL (ref 20–400)
DIAZEPAM UR QL: NOT DETECTED
ETHYL GLUCURONIDE UR QL: NOT DETECTED
FENTANYL UR QL: NOT DETECTED
HYDROCODONE UR QL: PRESENT
HYDROMORPHONE UR QL: NOT DETECTED
LORAZEPAM UR QL: NOT DETECTED
MDA UR QL: NOT DETECTED
MDEA UR QL: NOT DETECTED
MDMA UR QL: NOT DETECTED
ME-PHENIDATE UR QL: NOT DETECTED
MEPERIDINE UR QL: NOT DETECTED
METHADONE UR QL: NOT DETECTED
METHAMPHET UR QL: NOT DETECTED
MIDAZOLAM UR QL SCN: NOT DETECTED
MORPHINE UR QL: NOT DETECTED
NORBUPRENORPHINE UR QL CFM: NOT DETECTED
NORDIAZEPAM UR QL: NOT DETECTED
NORFENTANYL UR QL: NOT DETECTED
NORHYDROCODONE UR QL CFM: PRESENT
NOROXYCODONE UR QL CFM: NOT DETECTED
NOROXYMORPHONE: NOT DETECTED
OXAZEPAM UR QL: NOT DETECTED
OXYCODONE UR QL: NOT DETECTED
OXYMORPHONE UR QL: NOT DETECTED
PATHOLOGY STUDY: NORMAL
PCP UR QL: NOT DETECTED
PHENTERMINE UR QL: NOT DETECTED
PROPOXYPH UR QL: NOT DETECTED
SERVICE CMNT-IMP: NORMAL
TAPENTADOL UR QL SCN: NOT DETECTED
TAPENTADOL-O-SULF: NOT DETECTED
TEMAZEPAM UR QL: NOT DETECTED
TRAMADOL UR QL: NOT DETECTED
ZOLPIDEM UR QL: NOT DETECTED

## 2018-06-05 DIAGNOSIS — G89.4 CHRONIC PAIN DISORDER: ICD-10-CM

## 2018-06-05 NOTE — TELEPHONE ENCOUNTER
----- Message from Carol Price sent at 6/5/2018  1:25 PM CDT -----  Contact: self  Type:  RX Refill Request    Who Called:  patient  Refill or New Rx:  refill  RX Name and Strength: hydrocodone-acetaminophen 5-325mg (NORCO) 5-325 mg per tablet  How is the patient currently taking it? (ex. 1XDay):   Take 1 tablet by mouth every 6 (six) hours as needed for Pain. - Oral  Is this a 30 day or 90 day RX: 30  Preferred Pharmacy with phone number:        [id number] - [place], [place] - [address]  [address]  [place] [address]  Phone: [phone] Fax: [phone]      Local or Mail Order: local  Ordering Provider:  Josefa Carlos Call Back Number:  [phone] (home)     Additional Information:  refill

## 2018-06-06 RX ORDER — HYDROCODONE BITARTRATE AND ACETAMINOPHEN 5; 325 MG/1; MG/1
1 TABLET ORAL EVERY 6 HOURS PRN
Qty: 120 TABLET | Refills: 0 | Status: SHIPPED | OUTPATIENT
Start: 2018-06-06 | End: 2018-07-05 | Stop reason: SDUPTHER

## 2018-07-05 DIAGNOSIS — G89.4 CHRONIC PAIN DISORDER: ICD-10-CM

## 2018-07-05 RX ORDER — HYDROCODONE BITARTRATE AND ACETAMINOPHEN 5; 325 MG/1; MG/1
1 TABLET ORAL EVERY 6 HOURS PRN
Qty: 120 TABLET | Refills: 0 | Status: SHIPPED | OUTPATIENT
Start: 2018-07-05 | End: 2018-08-03 | Stop reason: SDUPTHER

## 2018-07-05 NOTE — TELEPHONE ENCOUNTER
----- Message from Julianna Flores sent at 7/5/2018  8:17 AM CDT -----  Contact: self  Type:  RX Refill Request    Who Called:  patient  Refill or New Rx:refill  RX Name and Strength:  HYDROcodone-acetaminophen (NORCO) 5-325 mg per tablet     How is the patient currently taking it? (ex. 1XDay):  1 tablet by mouth every 6 (six) hours as needed for Pain. - Oral      Is this a 30 day or 90 day RX:    Preferred Pharmacy with phone number:   [id number] - [place], [place] - [address]  [address]  [facility] [address]  Phone: [phone] Fax: [phone]      Local or Mail Order: local  Ordering Provider:  Josefa Carlos Call Back Number: [phone]  Additional Information:  refill

## 2018-07-05 NOTE — TELEPHONE ENCOUNTER
LOV 5/23/18  FOV  8/23/18  Toxicology 5/23/18- results appropriate  Pain contract signed 12/9/2016

## 2018-07-16 ENCOUNTER — LAB VISIT (OUTPATIENT)
Dept: LAB | Facility: HOSPITAL | Age: 68
End: 2018-07-16
Attending: INTERNAL MEDICINE
Payer: MEDICARE

## 2018-07-16 DIAGNOSIS — M06.041 RHEUMATOID ARTHRITIS INVOLVING BOTH HANDS WITH NEGATIVE RHEUMATOID FACTOR: ICD-10-CM

## 2018-07-16 DIAGNOSIS — M06.042 RHEUMATOID ARTHRITIS INVOLVING BOTH HANDS WITH NEGATIVE RHEUMATOID FACTOR: ICD-10-CM

## 2018-07-16 LAB
ALBUMIN SERPL BCP-MCNC: 4.2 G/DL
ALP SERPL-CCNC: 110 U/L
ALT SERPL W/O P-5'-P-CCNC: 9 U/L
ANION GAP SERPL CALC-SCNC: 9 MMOL/L
AST SERPL-CCNC: 15 U/L
BASOPHILS # BLD AUTO: 0.1 K/UL
BASOPHILS NFR BLD: 1.6 %
BILIRUB SERPL-MCNC: 0.5 MG/DL
BUN SERPL-MCNC: 13 MG/DL
CALCIUM SERPL-MCNC: 10 MG/DL
CHLORIDE SERPL-SCNC: 105 MMOL/L
CO2 SERPL-SCNC: 29 MMOL/L
CREAT SERPL-MCNC: 0.9 MG/DL
CRP SERPL-MCNC: 2.8 MG/L
DIFFERENTIAL METHOD: ABNORMAL
EOSINOPHIL # BLD AUTO: 0.1 K/UL
EOSINOPHIL NFR BLD: 1.2 %
ERYTHROCYTE [DISTWIDTH] IN BLOOD BY AUTOMATED COUNT: 14.8 %
ERYTHROCYTE [SEDIMENTATION RATE] IN BLOOD BY WESTERGREN METHOD: 2 MM/HR
EST. GFR  (AFRICAN AMERICAN): >60 ML/MIN/1.73 M^2
EST. GFR  (NON AFRICAN AMERICAN): >60 ML/MIN/1.73 M^2
GLUCOSE SERPL-MCNC: 96 MG/DL
HCT VFR BLD AUTO: 45.8 %
HGB BLD-MCNC: 15.3 G/DL
LYMPHOCYTES # BLD AUTO: 1.5 K/UL
LYMPHOCYTES NFR BLD: 35.3 %
MCH RBC QN AUTO: 30.8 PG
MCHC RBC AUTO-ENTMCNC: 33.5 G/DL
MCV RBC AUTO: 92 FL
MONOCYTES # BLD AUTO: 0.3 K/UL
MONOCYTES NFR BLD: 6.9 %
NEUTROPHILS # BLD AUTO: 2.4 K/UL
NEUTROPHILS NFR BLD: 55 %
PLATELET # BLD AUTO: 252 K/UL
PMV BLD AUTO: 8.8 FL
POTASSIUM SERPL-SCNC: 3.4 MMOL/L
PROT SERPL-MCNC: 6.9 G/DL
RBC # BLD AUTO: 4.98 M/UL
SODIUM SERPL-SCNC: 143 MMOL/L
WBC # BLD AUTO: 4.3 K/UL

## 2018-07-16 PROCEDURE — 85025 COMPLETE CBC W/AUTO DIFF WBC: CPT

## 2018-07-16 PROCEDURE — 80053 COMPREHEN METABOLIC PANEL: CPT

## 2018-07-16 PROCEDURE — 86140 C-REACTIVE PROTEIN: CPT

## 2018-07-16 PROCEDURE — 85651 RBC SED RATE NONAUTOMATED: CPT

## 2018-07-16 PROCEDURE — 36415 COLL VENOUS BLD VENIPUNCTURE: CPT

## 2018-07-25 DIAGNOSIS — M06.041 RHEUMATOID ARTHRITIS INVOLVING BOTH HANDS WITH NEGATIVE RHEUMATOID FACTOR: ICD-10-CM

## 2018-07-25 DIAGNOSIS — M06.042 RHEUMATOID ARTHRITIS INVOLVING BOTH HANDS WITH NEGATIVE RHEUMATOID FACTOR: ICD-10-CM

## 2018-07-25 RX ORDER — FOLIC ACID 1 MG/1
TABLET ORAL
Qty: 90 TABLET | Refills: 0 | Status: SHIPPED | OUTPATIENT
Start: 2018-07-25 | End: 2018-08-23 | Stop reason: SDUPTHER

## 2018-07-28 DIAGNOSIS — E55.9 VITAMIN D DEFICIENCY: ICD-10-CM

## 2018-07-30 RX ORDER — ERGOCALCIFEROL 1.25 MG/1
CAPSULE ORAL
Qty: 12 CAPSULE | Refills: 0 | Status: SHIPPED | OUTPATIENT
Start: 2018-07-30 | End: 2018-08-23 | Stop reason: SDUPTHER

## 2018-08-01 DIAGNOSIS — M06.042 RHEUMATOID ARTHRITIS INVOLVING BOTH HANDS WITH NEGATIVE RHEUMATOID FACTOR: ICD-10-CM

## 2018-08-01 DIAGNOSIS — M06.041 RHEUMATOID ARTHRITIS INVOLVING BOTH HANDS WITH NEGATIVE RHEUMATOID FACTOR: ICD-10-CM

## 2018-08-02 RX ORDER — METHOTREXATE 2.5 MG/1
10 TABLET ORAL
Qty: 20 TABLET | Refills: 0 | Status: SHIPPED | OUTPATIENT
Start: 2018-08-02 | End: 2018-09-08 | Stop reason: SDUPTHER

## 2018-08-03 DIAGNOSIS — G89.4 CHRONIC PAIN DISORDER: ICD-10-CM

## 2018-08-03 NOTE — TELEPHONE ENCOUNTER
----- Message from Latasha Fairbanks sent at 8/3/2018  8:26 AM CDT -----  Contact: patient  Type:  RX Refill Request    Who Called:  Patient  Refill or New Rx:  refill  RX Name and Strength:  HYDROcodone-acetaminophen (NORCO) 5-325 mg per tablet  How is the patient currently taking it? (ex. 1XDay):    Is this a 30 day or 90 day RX:    Preferred Pharmacy with phone number:  [facility]  Local or Mail Order:  local  Ordering Provider:    [facility] Call Back Number:  [phone]  Additional Information:  Requesting a call back when script has been sent

## 2018-08-04 RX ORDER — HYDROCODONE BITARTRATE AND ACETAMINOPHEN 5; 325 MG/1; MG/1
1 TABLET ORAL EVERY 6 HOURS PRN
Qty: 120 TABLET | Refills: 0 | Status: SHIPPED | OUTPATIENT
Start: 2018-08-04 | End: 2018-09-12 | Stop reason: SDUPTHER

## 2018-08-22 ENCOUNTER — DOCUMENTATION ONLY (OUTPATIENT)
Dept: FAMILY MEDICINE | Facility: CLINIC | Age: 68
End: 2018-08-22

## 2018-08-22 NOTE — PROGRESS NOTES
Pre-Visit Chart Review  For Appointment Scheduled on 8/23/18    Health Maintenance Due   Topic Date Due    TETANUS VACCINE  09/12/1968    Influenza Vaccine  08/01/2018

## 2018-08-23 ENCOUNTER — OFFICE VISIT (OUTPATIENT)
Dept: FAMILY MEDICINE | Facility: CLINIC | Age: 68
End: 2018-08-23
Payer: MEDICARE

## 2018-08-23 VITALS
TEMPERATURE: 98 F | HEIGHT: 60 IN | BODY MASS INDEX: 21.9 KG/M2 | WEIGHT: 111.56 LBS | SYSTOLIC BLOOD PRESSURE: 135 MMHG | HEART RATE: 62 BPM | DIASTOLIC BLOOD PRESSURE: 79 MMHG | OXYGEN SATURATION: 98 %

## 2018-08-23 DIAGNOSIS — I10 HYPERTENSION, WELL CONTROLLED: ICD-10-CM

## 2018-08-23 DIAGNOSIS — M06.041 RHEUMATOID ARTHRITIS INVOLVING BOTH HANDS WITH NEGATIVE RHEUMATOID FACTOR: ICD-10-CM

## 2018-08-23 DIAGNOSIS — M81.0 AGE RELATED OSTEOPOROSIS, UNSPECIFIED PATHOLOGICAL FRACTURE PRESENCE: ICD-10-CM

## 2018-08-23 DIAGNOSIS — E55.9 VITAMIN D DEFICIENCY: ICD-10-CM

## 2018-08-23 DIAGNOSIS — F11.20 OPIOID TYPE DEPENDENCE, CONTINUOUS USE: Primary | ICD-10-CM

## 2018-08-23 DIAGNOSIS — R63.6 UNDERWEIGHT: ICD-10-CM

## 2018-08-23 DIAGNOSIS — M06.042 RHEUMATOID ARTHRITIS INVOLVING BOTH HANDS WITH NEGATIVE RHEUMATOID FACTOR: ICD-10-CM

## 2018-08-23 PROCEDURE — 99999 PR PBB SHADOW E&M-EST. PATIENT-LVL IV: CPT | Mod: PBBFAC,,, | Performed by: PHYSICIAN ASSISTANT

## 2018-08-23 PROCEDURE — 99214 OFFICE O/P EST MOD 30 MIN: CPT | Mod: PBBFAC,PO | Performed by: PHYSICIAN ASSISTANT

## 2018-08-23 PROCEDURE — 99213 OFFICE O/P EST LOW 20 MIN: CPT | Mod: S$PBB,,, | Performed by: PHYSICIAN ASSISTANT

## 2018-08-23 RX ORDER — ERGOCALCIFEROL 1.25 MG/1
50000 CAPSULE ORAL
Qty: 4 CAPSULE | Refills: 5 | Status: SHIPPED | OUTPATIENT
Start: 2018-08-23 | End: 2019-04-08 | Stop reason: SDUPTHER

## 2018-08-23 RX ORDER — LOSARTAN POTASSIUM AND HYDROCHLOROTHIAZIDE 12.5; 5 MG/1; MG/1
1 TABLET ORAL DAILY
Qty: 30 TABLET | Refills: 11 | Status: SHIPPED | OUTPATIENT
Start: 2018-08-23 | End: 2018-11-20 | Stop reason: SDUPTHER

## 2018-08-23 NOTE — PROGRESS NOTES
Subjective:       Patient ID: Joceline Holcomb is a 67 y.o. female.    Chief Complaint: Follow-up (3 months)    Mrs. Holcomb is a 67 year old female who presents to clinic for follow up on chronic pain. She has been doing well since her last visit and has no new concerns today. She is taking norco 5/325 mg TID to QID PRN for arthritis and back pain. She is scheduled for repeat labs with Dr. Rivero in 3 months. She missed her last Rheumatology appt due to bad weather and needs to reschedule this. She is planning to follow with Dr. Barrett regarding osteoporosis.       Review of Systems   Constitutional: Negative for activity change, appetite change, chills, fatigue and fever.   Eyes: Negative for visual disturbance.   Respiratory: Negative for cough and shortness of breath.    Cardiovascular: Negative for chest pain, palpitations and leg swelling.   Musculoskeletal: Positive for arthralgias and back pain.   Neurological: Negative for dizziness, weakness, light-headedness and headaches.       Objective:      Vitals:    08/23/18 0945   BP: 135/79   Pulse: 62   Temp: 97.9 °F (36.6 °C)     Physical Exam   Constitutional: She is oriented to person, place, and time. She appears well-developed.   Thin framed female.   HENT:   Head: Normocephalic and atraumatic.   Eyes: Conjunctivae and EOM are normal. Pupils are equal, round, and reactive to light.   Cardiovascular: Normal rate, regular rhythm, normal heart sounds and intact distal pulses.   Pulmonary/Chest: Effort normal and breath sounds normal.   Neurological: She is alert and oriented to person, place, and time.   Skin: Skin is warm and dry.   Vitals reviewed.      Assessment:       1. Opioid type dependence, continuous use -uds 7/3/17 and contract 2/8/18    2. Hypertension, well controlled    3. Vitamin D deficiency    4. Age related osteoporosis, unspecified pathological fracture presence    5. Rheumatoid arthritis involving both hands with negative rheumatoid factor     6. Underweight    7. BMI 21.0-21.9, adult        Plan:       Opioid type dependence, continuous use -uds 5/23/18 and contract 2/8/18       Stable, continue current medications per Dr. Rivero       - UDS 5/23/2018       - Contract 2/8/2018     Follow up in 3 months    Hypertension, well controlled        Stable, continue current medications  -     losartan-hydrochlorothiazide 50-12.5 mg (HYZAAR) 50-12.5 mg per tablet; Take 1 tablet by mouth once daily.  Dispense: 30 tablet; Refill: 11    Vitamin D deficiency         Continue current medication and repeat labs prior to next visit  -     ergocalciferol (ERGOCALCIFEROL) 50,000 unit Cap; Take 1 capsule (50,000 Units total) by mouth every 7 days.  Dispense: 4 capsule; Refill: 5    Age related osteoporosis, unspecified pathological fracture presence        - Continue per Rheumatology    Rheumatoid arthritis involving both hands with negative rheumatoid factor  -     Ambulatory referral to Rheumatology. Overdue for f/u    Underweight         - Weight is up 5lbs since last OV. Continue to monitor    BMI 21.0-21.9, adult        - See below    Patient readiness: acceptance and barriers:none    During the course of the visit the patient was educated and counseled about the following:     Hypertension:   Medication: no change.  Underweight:  Follow up and re-weight in: 3  months and as needed.     Goals: Hypertension: Reduce Blood Pressure and Underweight: Increase calorie intake and BMI      Did patient meet goals/outcomes: Yes    The following self management tools provided: declined    Patient Instructions (the written plan) was given to the patient/family.     Time spent with patient: 15 minutes      3 months with Dr. Rivero with labs prior

## 2018-09-08 DIAGNOSIS — M06.041 RHEUMATOID ARTHRITIS INVOLVING BOTH HANDS WITH NEGATIVE RHEUMATOID FACTOR: ICD-10-CM

## 2018-09-08 DIAGNOSIS — M06.042 RHEUMATOID ARTHRITIS INVOLVING BOTH HANDS WITH NEGATIVE RHEUMATOID FACTOR: ICD-10-CM

## 2018-09-08 RX ORDER — METHOTREXATE 2.5 MG/1
TABLET ORAL
Qty: 20 TABLET | Refills: 0 | Status: SHIPPED | OUTPATIENT
Start: 2018-09-08 | End: 2018-11-20

## 2018-09-10 ENCOUNTER — TELEPHONE (OUTPATIENT)
Dept: FAMILY MEDICINE | Facility: CLINIC | Age: 68
End: 2018-09-10

## 2018-09-10 DIAGNOSIS — G89.4 CHRONIC PAIN DISORDER: ICD-10-CM

## 2018-09-10 NOTE — TELEPHONE ENCOUNTER
----- Message from Ophelia Guerra sent at 9/10/2018 10:42 AM CDT -----  Contact: self  Needs refill on HYDROcodone-acetaminophen (NORCO) 5-325 mg per tablet takes 1 tab every 6 hours    Please call back at [phone] (home)     [facility] [id number] - [place], [place] - [address]  [address]  [place] [address]  Phone: [phone] Fax: [phone]

## 2018-09-12 RX ORDER — HYDROCODONE BITARTRATE AND ACETAMINOPHEN 5; 325 MG/1; MG/1
1 TABLET ORAL EVERY 6 HOURS PRN
Qty: 120 TABLET | Refills: 0 | Status: SHIPPED | OUTPATIENT
Start: 2018-09-12 | End: 2018-10-05 | Stop reason: SDUPTHER

## 2018-10-05 DIAGNOSIS — G89.4 CHRONIC PAIN DISORDER: ICD-10-CM

## 2018-10-05 NOTE — TELEPHONE ENCOUNTER
----- Message from Marquiat Cuevas sent at 10/5/2018 10:39 AM CDT -----  Contact: patient, [phone]  Refill request  HYDROcodone-acetaminophen (NORCO) 5-325 mg per tablet     [facility] [place] - [place] - [address]  [address]  [place] [address]  Phone: [phone] Fax: [phone]

## 2018-10-08 RX ORDER — HYDROCODONE BITARTRATE AND ACETAMINOPHEN 5; 325 MG/1; MG/1
1 TABLET ORAL EVERY 6 HOURS PRN
Qty: 120 TABLET | Refills: 0 | Status: SHIPPED | OUTPATIENT
Start: 2018-10-08 | End: 2018-11-16 | Stop reason: SDUPTHER

## 2018-10-09 DIAGNOSIS — I10 HYPERTENSION, WELL CONTROLLED: ICD-10-CM

## 2018-10-09 RX ORDER — LOSARTAN POTASSIUM AND HYDROCHLOROTHIAZIDE 12.5; 5 MG/1; MG/1
TABLET ORAL
Qty: 90 TABLET | Refills: 3 | Status: SHIPPED | OUTPATIENT
Start: 2018-10-09 | End: 2019-10-03 | Stop reason: SDUPTHER

## 2018-10-11 DIAGNOSIS — M06.041 RHEUMATOID ARTHRITIS INVOLVING BOTH HANDS WITH NEGATIVE RHEUMATOID FACTOR: ICD-10-CM

## 2018-10-11 DIAGNOSIS — M06.042 RHEUMATOID ARTHRITIS INVOLVING BOTH HANDS WITH NEGATIVE RHEUMATOID FACTOR: ICD-10-CM

## 2018-10-11 RX ORDER — METHOTREXATE 2.5 MG/1
TABLET ORAL
Qty: 20 TABLET | Refills: 0 | OUTPATIENT
Start: 2018-10-11

## 2018-10-16 DIAGNOSIS — M06.042 RHEUMATOID ARTHRITIS INVOLVING BOTH HANDS WITH NEGATIVE RHEUMATOID FACTOR: ICD-10-CM

## 2018-10-16 DIAGNOSIS — M06.041 RHEUMATOID ARTHRITIS INVOLVING BOTH HANDS WITH NEGATIVE RHEUMATOID FACTOR: ICD-10-CM

## 2018-10-16 RX ORDER — METHOTREXATE 2.5 MG/1
TABLET ORAL
Qty: 20 TABLET | Refills: 0 | Status: CANCELLED | OUTPATIENT
Start: 2018-10-16

## 2018-10-19 DIAGNOSIS — E55.9 VITAMIN D DEFICIENCY: ICD-10-CM

## 2018-10-19 RX ORDER — ERGOCALCIFEROL 1.25 MG/1
50000 CAPSULE ORAL
Qty: 4 CAPSULE | Refills: 5 | Status: CANCELLED | OUTPATIENT
Start: 2018-10-19

## 2018-10-20 DIAGNOSIS — M06.041 RHEUMATOID ARTHRITIS INVOLVING BOTH HANDS WITH NEGATIVE RHEUMATOID FACTOR: ICD-10-CM

## 2018-10-20 DIAGNOSIS — M06.042 RHEUMATOID ARTHRITIS INVOLVING BOTH HANDS WITH NEGATIVE RHEUMATOID FACTOR: ICD-10-CM

## 2018-10-22 RX ORDER — FOLIC ACID 1 MG/1
TABLET ORAL
Qty: 90 TABLET | Refills: 0 | Status: SHIPPED | OUTPATIENT
Start: 2018-10-22 | End: 2019-01-20 | Stop reason: SDUPTHER

## 2018-11-13 ENCOUNTER — LAB VISIT (OUTPATIENT)
Dept: LAB | Facility: HOSPITAL | Age: 68
End: 2018-11-13
Attending: FAMILY MEDICINE
Payer: MEDICARE

## 2018-11-13 DIAGNOSIS — E55.9 VITAMIN D DEFICIENCY: ICD-10-CM

## 2018-11-13 DIAGNOSIS — E78.00 HYPERCHOLESTEREMIA: ICD-10-CM

## 2018-11-13 LAB
25(OH)D3+25(OH)D2 SERPL-MCNC: 38 NG/ML
ALBUMIN SERPL BCP-MCNC: 3.6 G/DL
ALP SERPL-CCNC: 98 U/L
ALT SERPL W/O P-5'-P-CCNC: 11 U/L
ANION GAP SERPL CALC-SCNC: 8 MMOL/L
AST SERPL-CCNC: 15 U/L
BASOPHILS # BLD AUTO: 0.1 K/UL
BASOPHILS NFR BLD: 1.6 %
BILIRUB SERPL-MCNC: 0.6 MG/DL
BUN SERPL-MCNC: 15 MG/DL
CALCIUM SERPL-MCNC: 9.4 MG/DL
CHLORIDE SERPL-SCNC: 106 MMOL/L
CHOLEST SERPL-MCNC: 199 MG/DL
CHOLEST/HDLC SERPL: 3.6 {RATIO}
CO2 SERPL-SCNC: 28 MMOL/L
CREAT SERPL-MCNC: 0.8 MG/DL
DIFFERENTIAL METHOD: ABNORMAL
EOSINOPHIL # BLD AUTO: 0 K/UL
EOSINOPHIL NFR BLD: 0.7 %
ERYTHROCYTE [DISTWIDTH] IN BLOOD BY AUTOMATED COUNT: 14.9 %
EST. GFR  (AFRICAN AMERICAN): >60 ML/MIN/1.73 M^2
EST. GFR  (NON AFRICAN AMERICAN): >60 ML/MIN/1.73 M^2
GLUCOSE SERPL-MCNC: 85 MG/DL
HCT VFR BLD AUTO: 41 %
HDLC SERPL-MCNC: 55 MG/DL
HDLC SERPL: 27.6 %
HGB BLD-MCNC: 13.7 G/DL
LDLC SERPL CALC-MCNC: 123.6 MG/DL
LYMPHOCYTES # BLD AUTO: 1.5 K/UL
LYMPHOCYTES NFR BLD: 34.5 %
MCH RBC QN AUTO: 31.1 PG
MCHC RBC AUTO-ENTMCNC: 33.4 G/DL
MCV RBC AUTO: 93 FL
MONOCYTES # BLD AUTO: 0.3 K/UL
MONOCYTES NFR BLD: 7.2 %
NEUTROPHILS # BLD AUTO: 2.5 K/UL
NEUTROPHILS NFR BLD: 56 %
NONHDLC SERPL-MCNC: 144 MG/DL
PLATELET # BLD AUTO: 228 K/UL
PMV BLD AUTO: 9.1 FL
POTASSIUM SERPL-SCNC: 3.5 MMOL/L
PROT SERPL-MCNC: 6.3 G/DL
RBC # BLD AUTO: 4.41 M/UL
SODIUM SERPL-SCNC: 142 MMOL/L
TRIGL SERPL-MCNC: 102 MG/DL
WBC # BLD AUTO: 4.4 K/UL

## 2018-11-13 PROCEDURE — 80061 LIPID PANEL: CPT

## 2018-11-13 PROCEDURE — 85025 COMPLETE CBC W/AUTO DIFF WBC: CPT

## 2018-11-13 PROCEDURE — 80053 COMPREHEN METABOLIC PANEL: CPT

## 2018-11-13 PROCEDURE — 36415 COLL VENOUS BLD VENIPUNCTURE: CPT

## 2018-11-13 PROCEDURE — 82306 VITAMIN D 25 HYDROXY: CPT

## 2018-11-16 DIAGNOSIS — G89.4 CHRONIC PAIN DISORDER: ICD-10-CM

## 2018-11-16 RX ORDER — HYDROCODONE BITARTRATE AND ACETAMINOPHEN 5; 325 MG/1; MG/1
1 TABLET ORAL EVERY 6 HOURS PRN
Qty: 120 TABLET | Refills: 0 | Status: SHIPPED | OUTPATIENT
Start: 2018-11-16 | End: 2018-12-18 | Stop reason: SDUPTHER

## 2018-11-16 NOTE — TELEPHONE ENCOUNTER
----- Message from Brennon Sheriff sent at 11/16/2018  8:04 AM CST -----  Contact: pt  Pt is requesting a refill on her HYDROcodone-acetaminophen (NORCO) 5-325 mg per tablet  Call Back#[phone]  Thanks    [facility] - [place], [place] - [address]  [address]  [place] [address]  Phone: [phone] Fax: [phone]

## 2018-11-20 ENCOUNTER — OFFICE VISIT (OUTPATIENT)
Dept: FAMILY MEDICINE | Facility: CLINIC | Age: 68
End: 2018-11-20
Payer: MEDICARE

## 2018-11-20 VITALS
HEIGHT: 60 IN | SYSTOLIC BLOOD PRESSURE: 127 MMHG | WEIGHT: 109.13 LBS | RESPIRATION RATE: 12 BRPM | DIASTOLIC BLOOD PRESSURE: 80 MMHG | BODY MASS INDEX: 21.42 KG/M2 | TEMPERATURE: 98 F | HEART RATE: 65 BPM | OXYGEN SATURATION: 99 %

## 2018-11-20 DIAGNOSIS — I10 HYPERTENSION, WELL CONTROLLED: Primary | ICD-10-CM

## 2018-11-20 DIAGNOSIS — M06.042 RHEUMATOID ARTHRITIS INVOLVING BOTH HANDS WITH NEGATIVE RHEUMATOID FACTOR: ICD-10-CM

## 2018-11-20 DIAGNOSIS — F11.20 OPIOID TYPE DEPENDENCE, CONTINUOUS USE: ICD-10-CM

## 2018-11-20 DIAGNOSIS — M06.041 RHEUMATOID ARTHRITIS INVOLVING BOTH HANDS WITH NEGATIVE RHEUMATOID FACTOR: ICD-10-CM

## 2018-11-20 PROCEDURE — 99214 OFFICE O/P EST MOD 30 MIN: CPT | Mod: S$PBB,,, | Performed by: FAMILY MEDICINE

## 2018-11-20 PROCEDURE — 99214 OFFICE O/P EST MOD 30 MIN: CPT | Mod: PBBFAC,PO | Performed by: FAMILY MEDICINE

## 2018-11-20 PROCEDURE — 99999 PR PBB SHADOW E&M-EST. PATIENT-LVL IV: CPT | Mod: PBBFAC,,, | Performed by: FAMILY MEDICINE

## 2018-11-20 RX ORDER — METHOTREXATE 2.5 MG/1
TABLET ORAL
Qty: 20 TABLET | Refills: 0 | Status: SHIPPED | OUTPATIENT
Start: 2018-11-20 | End: 2018-12-06 | Stop reason: SDUPTHER

## 2018-11-20 NOTE — PROGRESS NOTES
Subjective:       Patient ID: Joceline Holcomb is a 68 y.o. female.    Chief Complaint: Follow-up (3mth f/u hypertension)    HPI  Review of Systems   Constitutional: Negative for fatigue and unexpected weight change.   Respiratory: Negative for chest tightness and shortness of breath.    Cardiovascular: Negative for chest pain, palpitations and leg swelling.   Gastrointestinal: Negative for abdominal pain.   Musculoskeletal: Negative for arthralgias.   Neurological: Negative for dizziness, syncope, light-headedness and headaches.       Patient Active Problem List   Diagnosis    Rheumatoid arthritis involving both hands with negative rheumatoid factor    Osteoporosis    Hypertension, well controlled    Long term methotrexate user    Vitamin D deficiency    Marijuana use    Compression fracture of vertebra with routine healing 11/17    Latent tuberculosis by blood test    Opioid type dependence, continuous use -uds 5/23/2018 and contract 2/8/18    Underweight    BMI 21.0-21.9, adult     Patient is here for a chronic conditions follow up.    Lab Visit on 11/13/2018   Component Date Value Ref Range Status    WBC 11/13/2018 4.40  3.90 - 12.70 K/uL Final    RBC 11/13/2018 4.41  4.00 - 5.40 M/uL Final    Hemoglobin 11/13/2018 13.7  12.0 - 16.0 g/dL Final    Hematocrit 11/13/2018 41.0  37.0 - 48.5 % Final    MCV 11/13/2018 93  82 - 98 fL Final    MCH 11/13/2018 31.1* 27.0 - 31.0 pg Final    MCHC 11/13/2018 33.4  32.0 - 36.0 g/dL Final    RDW 11/13/2018 14.9* 11.5 - 14.5 % Final    Platelets 11/13/2018 228  150 - 350 K/uL Final    MPV 11/13/2018 9.1* 9.2 - 12.9 fL Final    Gran # (ANC) 11/13/2018 2.5  1.8 - 7.7 K/uL Final    Lymph # 11/13/2018 1.5  1.0 - 4.8 K/uL Final    Mono # 11/13/2018 0.3  0.3 - 1.0 K/uL Final    Eos # 11/13/2018 0.0  0.0 - 0.5 K/uL Final    Baso # 11/13/2018 0.10  0.00 - 0.20 K/uL Final    Gran% 11/13/2018 56.0  38.0 - 73.0 % Final    Lymph% 11/13/2018 34.5  18.0 - 48.0 %  Final    Mono% 11/13/2018 7.2  4.0 - 15.0 % Final    Eosinophil% 11/13/2018 0.7  0.0 - 8.0 % Final    Basophil% 11/13/2018 1.6  0.0 - 1.9 % Final    Differential Method 11/13/2018 Automated   Final    Sodium 11/13/2018 142  136 - 145 mmol/L Final    Potassium 11/13/2018 3.5  3.5 - 5.1 mmol/L Final    Chloride 11/13/2018 106  95 - 110 mmol/L Final    CO2 11/13/2018 28  23 - 29 mmol/L Final    Glucose 11/13/2018 85  70 - 110 mg/dL Final    BUN, Bld 11/13/2018 15  8 - 23 mg/dL Final    Creatinine 11/13/2018 0.8  0.5 - 1.4 mg/dL Final    Calcium 11/13/2018 9.4  8.7 - 10.5 mg/dL Final    Total Protein 11/13/2018 6.3  6.0 - 8.4 g/dL Final    Albumin 11/13/2018 3.6  3.5 - 5.2 g/dL Final    Total Bilirubin 11/13/2018 0.6  0.1 - 1.0 mg/dL Final    Alkaline Phosphatase 11/13/2018 98  55 - 135 U/L Final    AST 11/13/2018 15  10 - 40 U/L Final    ALT 11/13/2018 11  10 - 44 U/L Final    Anion Gap 11/13/2018 8  8 - 16 mmol/L Final    eGFR if African American 11/13/2018 >60  >60 mL/min/1.73 m^2 Final    eGFR if non African American 11/13/2018 >60  >60 mL/min/1.73 m^2 Final    Cholesterol 11/13/2018 199  120 - 199 mg/dL Final    Triglycerides 11/13/2018 102  30 - 150 mg/dL Final    HDL 11/13/2018 55  40 - 75 mg/dL Final    LDL Cholesterol 11/13/2018 123.6  63.0 - 159.0 mg/dL Final    HDL/Chol Ratio 11/13/2018 27.6  20.0 - 50.0 % Final    Total Cholesterol/HDL Ratio 11/13/2018 3.6  2.0 - 5.0 Final    Non-HDL Cholesterol 11/13/2018 144  mg/dL Final    Vit D, 25-Hydroxy 11/13/2018 38  30 - 96 ng/mL Final     Continuous use of opioids follow-up:  Current medication and dosing:  norco 5 qid  Supply:  30 day supply  Side effects: none  Pain controlled: yes  Medication compliance: yes  DPS checked today: yes no evidence of diversion  UDS: 5/18  pain contract signed: 2/18  Opioid risk tool done:n/a  Cause of Pain: RA  Objective:      Physical Exam   Constitutional: She is oriented to person, place, and time.  She appears well-developed and well-nourished.   Cardiovascular: Normal rate, regular rhythm and normal heart sounds.   Pulmonary/Chest: Effort normal and breath sounds normal.   Musculoskeletal: She exhibits no edema.   Neurological: She is alert and oriented to person, place, and time.   Skin: Skin is warm and dry.   Psychiatric: She has a normal mood and affect.   Nursing note and vitals reviewed.      Assessment:       1. Hypertension, well controlled    2. Rheumatoid arthritis involving both hands with negative rheumatoid factor    3. BMI 21.0-21.9, adult    4. Opioid type dependence, continuous use -uds 5/23/2018 and contract 2/8/18        Plan:         1. Rheumatoid arthritis involving both hands with negative rheumatoid factor  Cont rheum care  - methotrexate 2.5 MG Tab; TAKE 4 TABLETS(10 MG) BY MOUTH EVERY 7 DAYS.  Dispense: 20 tablet; Refill: 0    2. Hypertension, well controlled  Controlled on current medications.  Continue current medications.      3. BMI 21.0-21.9, adult  Stable and adequate nutritional intake per interview    4. Opioid type dependence, continuous use -uds 5/23/2018 and contract 2/8/18  Controlled on current medications.  Continue current medications.  Counseled patient on habit forming potential of opiates, risk of sedation, respiratory suppression or arrest especially if used in conjunction with benzodiazepines or alcohol.  Counseled patient to avoid driving while on the medication, rules of the pain contract and need for routine 3 month follow ups.  Patient agrees to all aspects of the plan of care and wishes to proceed with therapy.  The plan is always to use alternatives less habit forming, to utilize interventions and therapies that reduce pain as an adjunctive treatment, and limit and wean the dose of narcotics as soon as able and appropriate.        Time spent with patient: 20 minutes    Patient with be reevaluated in 3 months or sooner prn    Greater than 50% of this visit was  spent counseling as described in above documentation:Yes

## 2018-11-20 NOTE — PATIENT INSTRUCTIONS
Established High Blood Pressure    High blood pressure (hypertension) is a chronic disease. Often, healthcare providers dont know what causes it. But it can be caused by certain health conditions and medicines.  If you have high blood pressure, you may not have any symptoms. If you do have symptoms, they may include headache, dizziness, changes in your vision, chest pain, and shortness of breath. But even without symptoms, high blood pressure thats not treated raises your risk for heart attack and stroke. High blood pressure is a serious health risk and shouldnt be ignored.  A blood pressure reading is made up of two numbers: a higher number over a lower number. The top number is the systolic pressure. The bottom number is the diastolic pressure. A normal blood pressure is a systolic pressure of  less than 120 over a diastolic pressure of less than 80. You will see your blood pressure readings written together. For example, a person with a systolic pressure of 188 and a diastolic pressure of 78 will have 118/78 written in the medical record.  High blood pressure is when either the top number is 140 or higher, or the bottom number is 90 or higher. This must be the result when taking your blood pressure a number of times. The blood pressures between normal and high are called prehypertension.  Home care  If you have high blood pressure, you should do what is listed below to lower your blood pressure. If you are taking medicines for high blood pressure, these methods may reduce or end your need for medicines in the future.  · Begin a weight-loss program if you are overweight.  · Cut back on how much salt you get in your diet. Heres how to do this:  ¨ Dont eat foods that have a lot of salt. These include olives, pickles, smoked meats, and salted potato chips.  ¨ Dont add salt to your food at the table.  ¨ Use only small amounts of salt when cooking.  · Start an exercise program. Talk with your healthcare  provider about the type of exercise program that would be best for you. It doesn't have to be hard. Even brisk walking for 20 minutes 3 times a week is a good form of exercise.  · Dont take medicines that stimulate the heart. This includes many over-the-counter cold and sinus decongestant pills and sprays, as well as diet pills. Check the warnings about hypertension on the label. Before buying any over-the-counter medicines or supplements, always ask the pharmacist about the product's potential interaction with your high blood pressure and your high blood pressure medicines.  · Stimulants such as amphetamine or cocaine could be deadly for someone with high blood pressure. Never take these.  · Limit how much caffeine you get in your diet. Switch to caffeine-free products.  · Stop smoking. If you are a long-time smoker, this can be hard. Talk to your healthcare provider about medicines and nicotine replacement options to help you. Also, enroll in a stop-smoking program to make it more likely that you will quit for good.  · Learn how to handle stress. This is an important part of any program to lower blood pressure. Learn about relaxation methods like meditation, yoga, or biofeedback.  · If your provider prescribed medicines, take them exactly as directed. Missing doses may cause your blood pressure get out of control.  · If you miss a dose or doses, check with your healthcare provider or pharmacist about what to do.  · Consider buying an automatic blood pressure machine. Ask your provider for a recommendation. You can get one of these at most pharmacies.     The American Heart Association recommends the following guidelines for home blood pressure monitoring:  · Don't smoke or drink coffee for 30 minutes before taking your blood pressure.  · Go to the bathroom before the test.  · Relax for 5 minutes before taking the measurement.  · Sit with your back supported (don't sit on a couch or soft chair); keep your feet on  the floor uncrossed. Place your arm on a solid flat surface (like a table) with the upper part of the arm at heart level. Place the middle of the cuff directly above the eye of the elbow. Check the monitor's instruction manual for an illustration.  · Take multiple readings. When you measure, take 2 to 3 readings one minute apart and record all of the results.  · Take your blood pressure at the same time every day, or as your healthcare provider recommends.  · Record the date, time, and blood pressure reading.  · Take the record with you to your next medical appointment. If your blood pressure monitor has a built-in memory, simply take the monitor with you to your next appointment.  · Call your provider if you have several high readings. Don't be frightened by a single high blood pressure reading, but if you get several high readings, check in with your healthcare provider.  · Note: When blood pressure reaches a systolic (top number) of 180 or higher OR diastolic (bottom number) of 110 or higher, seek emergency medical treatment.  Follow-up care  You will need to see your healthcare provider regularly. This is to check your blood pressure and to make changes to your medicines. Make a follow-up appointment as directed. Bring the record of your home blood pressure readings to the appointment.  When to seek medical advice  Call your healthcare provider right away if any of these occur:  · Blood pressure reaches a systolic (upper number) of 180 or higher OR a diastolic (bottom number) of 110 or higher  · Chest pain or shortness of breath  · Severe headache  · Throbbing or rushing sound in the ears  · Nosebleed  · Sudden severe pain in your belly (abdomen)  · Extreme drowsiness, confusion, or fainting  · Dizziness or spinning sensation (vertigo)  · Weakness of an arm or leg or one side of the face  · You have problems speaking or seeing   Date Last Reviewed: 12/1/2016  © 1818-4890 [employer]. [address] [place] [address]  [place] [address]. All rights reserved. This information is not intended as a substitute for professional medical care. Always follow your healthcare professional's instructions.

## 2018-12-06 ENCOUNTER — OFFICE VISIT (OUTPATIENT)
Dept: RHEUMATOLOGY | Facility: CLINIC | Age: 68
End: 2018-12-06
Payer: MEDICARE

## 2018-12-06 VITALS
DIASTOLIC BLOOD PRESSURE: 81 MMHG | BODY MASS INDEX: 21.85 KG/M2 | HEIGHT: 60 IN | HEART RATE: 64 BPM | WEIGHT: 111.31 LBS | SYSTOLIC BLOOD PRESSURE: 138 MMHG

## 2018-12-06 DIAGNOSIS — M06.042 RHEUMATOID ARTHRITIS INVOLVING BOTH HANDS WITH NEGATIVE RHEUMATOID FACTOR: Primary | ICD-10-CM

## 2018-12-06 DIAGNOSIS — M81.0 AGE RELATED OSTEOPOROSIS, UNSPECIFIED PATHOLOGICAL FRACTURE PRESENCE: ICD-10-CM

## 2018-12-06 DIAGNOSIS — M06.041 RHEUMATOID ARTHRITIS INVOLVING BOTH HANDS WITH NEGATIVE RHEUMATOID FACTOR: Primary | ICD-10-CM

## 2018-12-06 DIAGNOSIS — Z79.631 LONG TERM METHOTREXATE USER: ICD-10-CM

## 2018-12-06 PROCEDURE — 99999 PR PBB SHADOW E&M-EST. PATIENT-LVL III: CPT | Mod: PBBFAC,,, | Performed by: INTERNAL MEDICINE

## 2018-12-06 PROCEDURE — 99213 OFFICE O/P EST LOW 20 MIN: CPT | Mod: PBBFAC,PO | Performed by: INTERNAL MEDICINE

## 2018-12-06 PROCEDURE — 99214 OFFICE O/P EST MOD 30 MIN: CPT | Mod: S$PBB,,, | Performed by: INTERNAL MEDICINE

## 2018-12-06 RX ORDER — ALENDRONATE SODIUM 70 MG/1
70 TABLET ORAL
Qty: 4 TABLET | Refills: 3 | Status: SHIPPED | OUTPATIENT
Start: 2018-12-06 | End: 2018-12-06 | Stop reason: SDUPTHER

## 2018-12-06 RX ORDER — ALENDRONATE SODIUM 70 MG/1
TABLET ORAL
Qty: 12 TABLET | Refills: 0 | Status: SHIPPED | OUTPATIENT
Start: 2018-12-06 | End: 2019-02-14 | Stop reason: SDUPTHER

## 2018-12-06 RX ORDER — FOLIC ACID 1 MG/1
1 TABLET ORAL DAILY
Qty: 30 TABLET | Refills: 11 | Status: SHIPPED | OUTPATIENT
Start: 2018-12-06 | End: 2019-08-05 | Stop reason: SDUPTHER

## 2018-12-06 RX ORDER — METHOTREXATE 2.5 MG/1
TABLET ORAL
Qty: 20 TABLET | Refills: 3 | Status: SHIPPED | OUTPATIENT
Start: 2018-12-06 | End: 2019-01-03

## 2018-12-06 ASSESSMENT — DISEASE ACTIVITY SCORE (DAS28)
SWOLLEN_JOINTS_COUNT: 1
TOTAL_SCORE_CRP: 2.52
TOTAL_SCORE_ESR: 1.4
ESR_MM_PER_HR: 2
CRP_MG_PER_LITER: 5
GLOBAL_HEALTH_SCORE: 5.5
TENDER_JOINTS_COUNT: 1

## 2018-12-06 ASSESSMENT — ROUTINE ASSESSMENT OF PATIENT INDEX DATA (RAPID3)
AM STIFFNESS SCORE: 1, YES
MDHAQ FUNCTION SCORE: .9
PSYCHOLOGICAL DISTRESS SCORE: 1.1
FATIGUE SCORE: 5
PATIENT GLOBAL ASSESSMENT SCORE: 5.5
TOTAL RAPID3 SCORE: 4.67
PAIN SCORE: 5.5
WHEN YOU AWAKENED IN THE MORNING OVER THE LAST WEEK, PLEASE INDICATE THE AMOUNT OF TIME IT TAKES UNTIL YOU ARE AS LIMBER AS YOU WILL BE FOR THE DAY: 30 MIN

## 2018-12-06 NOTE — PROGRESS NOTES
Subjective:       Patient ID: Joceline Holcomb is a 68 y.o. female.    Chief Complaint: Seronegative Rheumatoid arthritis  HPI 67-year-old female is here for follow up for seronegative rheumatoid arthritis. Diagnosed by Dr Gar.    Last seen by me on 3/19/18. Missed apt since then. Ran out of MTX.    Previously,took Plaquenil discontinued secondary to palmar rash, sulfasalazine-incomplete response  S/P INH and pyridoxine for latent TB.    Today, pain score is 5/10 located in R 2nd mcp joint, aching type, continuous worse as the day progresses, worse with activity, better with rest. + joint effusion.    Osteoporosis s/p compressionfracture- Prolia not covered by insurance   She denies fever, weight loss, dry eyes or mouth, photosensitivity, ulcer, raynaud's phenomenon, alopecia, dysphagia, diarrhea or blood in the stools    Review of Systems   Constitutional: Negative for fever and unexpected weight change.   HENT: Negative for facial swelling and trouble swallowing.    Eyes: Negative for pain and redness.   Respiratory: Negative for cough and shortness of breath.    Cardiovascular: Negative for chest pain and leg swelling.   Gastrointestinal: Negative for abdominal distention and abdominal pain.   Musculoskeletal: Negative for arthralgias and gait problem.   Skin: Negative for color change and rash.         Objective:     /81 (BP Location: Left arm, Patient Position: Sitting)   Pulse 64   Ht 5' (1.524 m)   Wt 50.5 kg (111 lb 5.3 oz)   BMI 21.74 kg/m²      Physical Exam   Constitutional: She is oriented to person, place, and time and well-developed, well-nourished, and in no distress.   HENT:   Head: Normocephalic and atraumatic.   Eyes: Conjunctivae are normal. Pupils are equal, round, and reactive to light.   Neck: Neck supple.   Cardiovascular: Normal rate and regular rhythm.    Pulmonary/Chest: Effort normal and breath sounds normal.   Abdominal: Soft. Bowel sounds are normal.       Right Side  Rheumatological Exam     Examination finds the shoulder, elbow, wrist, knee, 1st PIP, 1st MCP, 2nd PIP, 3rd PIP, 3rd MCP, 4th PIP, 4th MCP, 5th PIP and 5th MCP normal.    The patient is tender to palpation of the 2nd MCP    She has swelling of the 2nd MCP    Left Side Rheumatological Exam     Examination finds the shoulder, elbow, wrist, knee, 1st PIP, 1st MCP, 2nd PIP, 2nd MCP, 3rd PIP, 3rd MCP, 4th PIP, 4th MCP, 5th PIP and 5th MCP normal.      Neurological: She is alert and oriented to person, place, and time.   Skin: Skin is warm and dry. No rash noted.     Psychiatric: Mood and affect normal.   Musculoskeletal:                  Lab Results   Component Value Date    WBC 4.40 11/13/2018    HGB 13.7 11/13/2018    HCT 41.0 11/13/2018    MCV 93 11/13/2018     11/13/2018     CMP  Sodium   Date Value Ref Range Status   11/13/2018 142 136 - 145 mmol/L Final     Potassium   Date Value Ref Range Status   11/13/2018 3.5 3.5 - 5.1 mmol/L Final     Chloride   Date Value Ref Range Status   11/13/2018 106 95 - 110 mmol/L Final     CO2   Date Value Ref Range Status   11/13/2018 28 23 - 29 mmol/L Final     Glucose   Date Value Ref Range Status   11/13/2018 85 70 - 110 mg/dL Final     BUN, Bld   Date Value Ref Range Status   11/13/2018 15 8 - 23 mg/dL Final     Creatinine   Date Value Ref Range Status   11/13/2018 0.8 0.5 - 1.4 mg/dL Final     Calcium   Date Value Ref Range Status   11/13/2018 9.4 8.7 - 10.5 mg/dL Final     Total Protein   Date Value Ref Range Status   11/13/2018 6.3 6.0 - 8.4 g/dL Final     Albumin   Date Value Ref Range Status   11/13/2018 3.6 3.5 - 5.2 g/dL Final     Total Bilirubin   Date Value Ref Range Status   11/13/2018 0.6 0.1 - 1.0 mg/dL Final     Comment:     For infants and newborns, interpretation of results should be based  on gestational age, weight and in agreement with clinical  observations.  Premature Infant recommended reference ranges:  Up to 24 hours.............<8.0 mg/dL  Up to 48  hours............<12.0 mg/dL  3-5 days..................<15.0 mg/dL  6-29 days.................<15.0 mg/dL       Alkaline Phosphatase   Date Value Ref Range Status   11/13/2018 98 55 - 135 U/L Final     AST   Date Value Ref Range Status   11/13/2018 15 10 - 40 U/L Final     ALT   Date Value Ref Range Status   11/13/2018 11 10 - 44 U/L Final     Anion Gap   Date Value Ref Range Status   11/13/2018 8 8 - 16 mmol/L Final     eGFR if    Date Value Ref Range Status   11/13/2018 >60 >60 mL/min/1.73 m^2 Final     eGFR if non    Date Value Ref Range Status   11/13/2018 >60 >60 mL/min/1.73 m^2 Final     Comment:     Calculation used to obtain the estimated glomerular filtration  rate (eGFR) is the CKD-EPI equation.        Lab Results   Component Value Date    SEDRATE 2 07/16/2018     Lab Results   Component Value Date    CRP 2.8 07/16/2018       Assessment:   Seronegative  rheumatoid arthritis DAS28 1.4-restart MTX   Latent TB-S/PINH and pyridoxine   Osteoporosis-start fosamax  Osteoarthritis of bilateral knees  Long-term use of methotrexate  Plan:   Restart methotrexate 10 mg per week  Continue folic acid 1 mg a day  Start fosamax,discussed side effects  Reviewed baseline GFR and Vit D levels  RTC in 3-4 months with labs

## 2018-12-18 ENCOUNTER — TELEPHONE (OUTPATIENT)
Dept: FAMILY MEDICINE | Facility: CLINIC | Age: 68
End: 2018-12-18

## 2018-12-18 DIAGNOSIS — G89.4 CHRONIC PAIN DISORDER: ICD-10-CM

## 2018-12-18 RX ORDER — HYDROCODONE BITARTRATE AND ACETAMINOPHEN 5; 325 MG/1; MG/1
1 TABLET ORAL EVERY 6 HOURS PRN
Qty: 120 TABLET | Refills: 0 | Status: SHIPPED | OUTPATIENT
Start: 2018-12-18 | End: 2019-01-17 | Stop reason: SDUPTHER

## 2018-12-18 NOTE — TELEPHONE ENCOUNTER
Medication Refill.  Last seen 11/20/18  Next Appointment 2/20/19  Contract signed 3/22/16  Last drug screen 5/23/18

## 2018-12-18 NOTE — TELEPHONE ENCOUNTER
----- Message from Amara Horne sent at 12/18/2018  8:29 AM CST -----  Contact: self   Patient need a refill on Hydrocodone please send to [facility], any questions please call back at [phone] (home)     [id number] - [place], [place] - [address]  [address]  [place] [address]  Phone: [phone] Fax: [phone]

## 2019-01-03 DIAGNOSIS — M06.041 RHEUMATOID ARTHRITIS INVOLVING BOTH HANDS WITH NEGATIVE RHEUMATOID FACTOR: ICD-10-CM

## 2019-01-03 DIAGNOSIS — M06.042 RHEUMATOID ARTHRITIS INVOLVING BOTH HANDS WITH NEGATIVE RHEUMATOID FACTOR: ICD-10-CM

## 2019-01-03 RX ORDER — METHOTREXATE 2.5 MG/1
TABLET ORAL
Qty: 20 TABLET | Refills: 0 | Status: SHIPPED | OUTPATIENT
Start: 2019-01-03 | End: 2019-08-05 | Stop reason: SDUPTHER

## 2019-01-17 DIAGNOSIS — G89.4 CHRONIC PAIN DISORDER: ICD-10-CM

## 2019-01-17 NOTE — TELEPHONE ENCOUNTER
----- Message from Cyndie Laws sent at 1/17/2019  1:05 PM CST -----  Pt is requesting refill for HYDROcodone-acetaminophen (NORCO) 5-325 mg per tablet 120 tablet  / call [phone]   [facility] - [place] - [address]  [address]  [place] [address]  Phone: [phone] Fax: [phone]

## 2019-01-19 RX ORDER — HYDROCODONE BITARTRATE AND ACETAMINOPHEN 5; 325 MG/1; MG/1
1 TABLET ORAL EVERY 6 HOURS PRN
Qty: 120 TABLET | Refills: 0 | Status: SHIPPED | OUTPATIENT
Start: 2019-01-19 | End: 2019-02-20 | Stop reason: SDUPTHER

## 2019-01-20 DIAGNOSIS — M06.042 RHEUMATOID ARTHRITIS INVOLVING BOTH HANDS WITH NEGATIVE RHEUMATOID FACTOR: ICD-10-CM

## 2019-01-20 DIAGNOSIS — M06.041 RHEUMATOID ARTHRITIS INVOLVING BOTH HANDS WITH NEGATIVE RHEUMATOID FACTOR: ICD-10-CM

## 2019-01-20 RX ORDER — FOLIC ACID 1 MG/1
TABLET ORAL
Qty: 90 TABLET | Refills: 0 | Status: SHIPPED | OUTPATIENT
Start: 2019-01-20 | End: 2019-02-20 | Stop reason: SDUPTHER

## 2019-02-15 RX ORDER — ALENDRONATE SODIUM 70 MG/1
TABLET ORAL
Qty: 12 TABLET | Refills: 0 | Status: SHIPPED | OUTPATIENT
Start: 2019-02-15 | End: 2019-05-10 | Stop reason: SDUPTHER

## 2019-02-20 ENCOUNTER — OFFICE VISIT (OUTPATIENT)
Dept: FAMILY MEDICINE | Facility: CLINIC | Age: 69
End: 2019-02-20
Payer: MEDICARE

## 2019-02-20 ENCOUNTER — DOCUMENTATION ONLY (OUTPATIENT)
Dept: FAMILY MEDICINE | Facility: CLINIC | Age: 69
End: 2019-02-20

## 2019-02-20 VITALS
BODY MASS INDEX: 21.99 KG/M2 | TEMPERATURE: 98 F | DIASTOLIC BLOOD PRESSURE: 76 MMHG | SYSTOLIC BLOOD PRESSURE: 132 MMHG | HEIGHT: 60 IN | HEART RATE: 63 BPM | WEIGHT: 112 LBS

## 2019-02-20 DIAGNOSIS — M06.041 RHEUMATOID ARTHRITIS INVOLVING BOTH HANDS WITH NEGATIVE RHEUMATOID FACTOR: Primary | ICD-10-CM

## 2019-02-20 DIAGNOSIS — M06.042 RHEUMATOID ARTHRITIS INVOLVING BOTH HANDS WITH NEGATIVE RHEUMATOID FACTOR: Primary | ICD-10-CM

## 2019-02-20 DIAGNOSIS — M81.0 AGE RELATED OSTEOPOROSIS, UNSPECIFIED PATHOLOGICAL FRACTURE PRESENCE: ICD-10-CM

## 2019-02-20 DIAGNOSIS — F11.20 OPIOID TYPE DEPENDENCE, CONTINUOUS USE: ICD-10-CM

## 2019-02-20 DIAGNOSIS — I10 HYPERTENSION, WELL CONTROLLED: ICD-10-CM

## 2019-02-20 DIAGNOSIS — G89.4 CHRONIC PAIN DISORDER: ICD-10-CM

## 2019-02-20 PROCEDURE — 99214 OFFICE O/P EST MOD 30 MIN: CPT | Mod: S$PBB,,, | Performed by: PHYSICIAN ASSISTANT

## 2019-02-20 PROCEDURE — 99214 PR OFFICE/OUTPT VISIT, EST, LEVL IV, 30-39 MIN: ICD-10-PCS | Mod: S$PBB,,, | Performed by: PHYSICIAN ASSISTANT

## 2019-02-20 PROCEDURE — 99213 OFFICE O/P EST LOW 20 MIN: CPT | Mod: PBBFAC,PO | Performed by: PHYSICIAN ASSISTANT

## 2019-02-20 PROCEDURE — 99999 PR PBB SHADOW E&M-EST. PATIENT-LVL III: CPT | Mod: PBBFAC,,, | Performed by: PHYSICIAN ASSISTANT

## 2019-02-20 PROCEDURE — 99999 PR PBB SHADOW E&M-EST. PATIENT-LVL III: ICD-10-PCS | Mod: PBBFAC,,, | Performed by: PHYSICIAN ASSISTANT

## 2019-02-20 NOTE — TELEPHONE ENCOUNTER
Patient compliant with 3 month follow-up appointments. Patient states she is out of her medication today and requesting refill. UDS UTD. 3 mo f/u scheduled with you. 6 month f/u with Meredith Garza NP. I cannot check [place] .

## 2019-02-20 NOTE — PROGRESS NOTES
Subjective:       Patient ID: Joceline Holcomb is a 68 y.o. female.    Chief Complaint: Follow-up    HPI   Patient is a 68-year-old female with hypertension, rheumatoid arthritis, compression fracture, opioid type dependence presenting to the clinic for three-month follow-up.  Patient states she is doing well at this time she is taking Norco 5-325 mg approximately 3 times daily as needed for pain associated with her RA.  She reports this medication helps take the edge off of making it tolerable.  She denies any excessive or falls with medication.  She is nearly out of her last prescription.  Review of Systems   Constitutional: Negative for activity change, appetite change, chills, diaphoresis, fatigue and fever.   HENT: Negative for congestion, postnasal drip and rhinorrhea.    Respiratory: Negative.  Negative for cough, shortness of breath and wheezing.    Cardiovascular: Negative.  Negative for chest pain.   Gastrointestinal: Negative for abdominal pain, blood in stool, constipation, diarrhea, nausea and vomiting.   Genitourinary: Negative for dysuria, frequency, hematuria and urgency.   Musculoskeletal: Positive for arthralgias and back pain.   Skin: Negative.  Negative for color change and rash.   Neurological: Negative for dizziness and syncope.   Psychiatric/Behavioral: Negative for agitation, behavioral problems and confusion.       Objective:      Physical Exam   Constitutional: Vital signs are normal. She appears well-developed. She appears cachectic. No distress.   Cardiovascular: Normal rate, regular rhythm, S1 normal, S2 normal and normal heart sounds. Exam reveals no gallop.   No murmur heard.  Pulses:       Radial pulses are 2+ on the right side, and 2+ on the left side.   Pulmonary/Chest: Effort normal and breath sounds normal. No respiratory distress. She has no wheezes. She has no rhonchi.   Skin: Skin is warm and dry. She is not diaphoretic.   Psychiatric: She has a normal mood and affect. Her  speech is normal and behavior is normal. Judgment and thought content normal. Cognition and memory are normal.       Assessment:       1. Rheumatoid arthritis involving both hands with negative rheumatoid factor    2. Opioid type dependence, continuous use    3. Hypertension, well controlled    4. Age related osteoporosis, unspecified pathological fracture presence        Plan:       Joceline was seen today for follow-up.    Diagnoses and all orders for this visit:    Rheumatoid arthritis involving both hands with negative rheumatoid factor  Stable patient was established with Rheumatology.  Refill request for Norco 5-325 mg q.i.d. P.r.n. sent to Dr. Rivero.   Patient is compliant with three-month follow-up appointments.  Her UDS has been performed within the past she will be due for a new pain contract at next visit if renewing every year.    Opioid type dependence, continuous use  Refill request for Norco 5-325 mg q.i.d. P.r.n. sent to Dr. Rivero.   Patient is compliant with three-month follow-up appointments.  Her UDS has been performed within the past she will be due for a new pain contract at next visit if renewing every year.    Hypertension, well controlled  Well controlled on current medication.    Age related osteoporosis, unspecified pathological fracture presence  Bone density scan performed last year.  She is currently on Fosamax 70 mg daily.  Will be due for new bone density scan next year.  Patient readiness: acceptance and barriers:none    During the course of the visit the patient was educated and counseled about the following:     Hypertension:   Medication: no change.    Goals: Hypertension: Reduce Blood Pressure    Did patient meet goals/outcomes: Yes    The following self management tools provided: declined    Patient Instructions (the written plan) was given to the patient/family.     Time spent with patient: 30 minutes    Barriers to medications present (no )    Adverse reactions to current  medications (no)    Over the counter medications reviewed (Yes)

## 2019-02-20 NOTE — PROGRESS NOTES
Pre-Visit Chart Review  For Appointment Scheduled on 02/20/19    Health Maintenance Due   Topic Date Due    TETANUS VACCINE  08/12/2017

## 2019-02-21 RX ORDER — HYDROCODONE BITARTRATE AND ACETAMINOPHEN 5; 325 MG/1; MG/1
1 TABLET ORAL EVERY 6 HOURS PRN
Qty: 120 TABLET | Refills: 0 | Status: SHIPPED | OUTPATIENT
Start: 2019-02-21 | End: 2019-03-25 | Stop reason: SDUPTHER

## 2019-02-28 DIAGNOSIS — Z12.11 COLON CANCER SCREENING: ICD-10-CM

## 2019-03-25 DIAGNOSIS — G89.4 CHRONIC PAIN DISORDER: ICD-10-CM

## 2019-03-25 NOTE — TELEPHONE ENCOUNTER
----- Message from Fernanda Rodríguez sent at 3/25/2019 12:56 PM CDT -----  Type:  RX Refill Request    Who Called:  Patient  Medication :HYDROcodone-acetaminophen (NORCO) 5-325 mg per tablet  Preferred Pharmacy with phone number:  [facility] at [phone] (Phone)Best Call Back Number:  [phone]  Additional Information:

## 2019-03-27 RX ORDER — HYDROCODONE BITARTRATE AND ACETAMINOPHEN 5; 325 MG/1; MG/1
1 TABLET ORAL EVERY 6 HOURS PRN
Qty: 120 TABLET | Refills: 0 | Status: SHIPPED | OUTPATIENT
Start: 2019-03-27 | End: 2019-04-29 | Stop reason: SDUPTHER

## 2019-04-08 ENCOUNTER — LAB VISIT (OUTPATIENT)
Dept: LAB | Facility: HOSPITAL | Age: 69
End: 2019-04-08
Attending: INTERNAL MEDICINE
Payer: MEDICARE

## 2019-04-08 ENCOUNTER — OFFICE VISIT (OUTPATIENT)
Dept: RHEUMATOLOGY | Facility: CLINIC | Age: 69
End: 2019-04-08
Payer: MEDICARE

## 2019-04-08 VITALS
HEART RATE: 67 BPM | HEIGHT: 60 IN | BODY MASS INDEX: 23.12 KG/M2 | SYSTOLIC BLOOD PRESSURE: 103 MMHG | WEIGHT: 117.75 LBS | DIASTOLIC BLOOD PRESSURE: 62 MMHG

## 2019-04-08 DIAGNOSIS — M06.041 RHEUMATOID ARTHRITIS INVOLVING BOTH HANDS WITH NEGATIVE RHEUMATOID FACTOR: ICD-10-CM

## 2019-04-08 DIAGNOSIS — Z71.85 IMMUNIZATION COUNSELING: ICD-10-CM

## 2019-04-08 DIAGNOSIS — Z79.631 LONG TERM METHOTREXATE USER: ICD-10-CM

## 2019-04-08 DIAGNOSIS — E55.9 VITAMIN D DEFICIENCY: ICD-10-CM

## 2019-04-08 DIAGNOSIS — M81.0 AGE RELATED OSTEOPOROSIS, UNSPECIFIED PATHOLOGICAL FRACTURE PRESENCE: ICD-10-CM

## 2019-04-08 DIAGNOSIS — M06.042 RHEUMATOID ARTHRITIS INVOLVING BOTH HANDS WITH NEGATIVE RHEUMATOID FACTOR: ICD-10-CM

## 2019-04-08 DIAGNOSIS — E55.9 VITAMIN D DEFICIENCY: Primary | ICD-10-CM

## 2019-04-08 LAB
ALBUMIN SERPL BCP-MCNC: 3.6 G/DL (ref 3.5–5.2)
ALP SERPL-CCNC: 85 U/L (ref 55–135)
ALT SERPL W/O P-5'-P-CCNC: 7 U/L (ref 10–44)
ANION GAP SERPL CALC-SCNC: 5 MMOL/L (ref 8–16)
AST SERPL-CCNC: 14 U/L (ref 10–40)
BASOPHILS # BLD AUTO: 0 K/UL (ref 0–0.2)
BASOPHILS NFR BLD: 0.7 % (ref 0–1.9)
BILIRUB SERPL-MCNC: 0.3 MG/DL (ref 0.1–1)
BUN SERPL-MCNC: 14 MG/DL (ref 8–23)
CALCIUM SERPL-MCNC: 9.5 MG/DL (ref 8.7–10.5)
CHLORIDE SERPL-SCNC: 104 MMOL/L (ref 95–110)
CO2 SERPL-SCNC: 30 MMOL/L (ref 23–29)
CREAT SERPL-MCNC: 0.8 MG/DL (ref 0.5–1.4)
CRP SERPL-MCNC: 4.2 MG/L (ref 0–8.2)
DIFFERENTIAL METHOD: ABNORMAL
EOSINOPHIL # BLD AUTO: 0 K/UL (ref 0–0.5)
EOSINOPHIL NFR BLD: 0.9 % (ref 0–8)
ERYTHROCYTE [DISTWIDTH] IN BLOOD BY AUTOMATED COUNT: 14.7 % (ref 11.5–14.5)
ERYTHROCYTE [SEDIMENTATION RATE] IN BLOOD BY WESTERGREN METHOD: 4 MM/HR (ref 0–20)
EST. GFR  (AFRICAN AMERICAN): >60 ML/MIN/1.73 M^2
EST. GFR  (NON AFRICAN AMERICAN): >60 ML/MIN/1.73 M^2
GLUCOSE SERPL-MCNC: 93 MG/DL (ref 70–110)
HCT VFR BLD AUTO: 37.8 % (ref 37–48.5)
HGB BLD-MCNC: 12.6 G/DL (ref 12–16)
LYMPHOCYTES # BLD AUTO: 1.5 K/UL (ref 1–4.8)
LYMPHOCYTES NFR BLD: 33.6 % (ref 18–48)
MCH RBC QN AUTO: 31 PG (ref 27–31)
MCHC RBC AUTO-ENTMCNC: 33.4 G/DL (ref 32–36)
MCV RBC AUTO: 93 FL (ref 82–98)
MONOCYTES # BLD AUTO: 0.3 K/UL (ref 0.3–1)
MONOCYTES NFR BLD: 6.8 % (ref 4–15)
NEUTROPHILS # BLD AUTO: 2.6 K/UL (ref 1.8–7.7)
NEUTROPHILS NFR BLD: 58 % (ref 38–73)
PLATELET # BLD AUTO: 226 K/UL (ref 150–350)
PMV BLD AUTO: 8.8 FL (ref 9.2–12.9)
POTASSIUM SERPL-SCNC: 3.4 MMOL/L (ref 3.5–5.1)
PROT SERPL-MCNC: 5.8 G/DL (ref 6–8.4)
RBC # BLD AUTO: 4.07 M/UL (ref 4–5.4)
SODIUM SERPL-SCNC: 139 MMOL/L (ref 136–145)
WBC # BLD AUTO: 4.4 K/UL (ref 3.9–12.7)

## 2019-04-08 PROCEDURE — 99214 OFFICE O/P EST MOD 30 MIN: CPT | Mod: S$PBB,,, | Performed by: INTERNAL MEDICINE

## 2019-04-08 PROCEDURE — 80053 COMPREHEN METABOLIC PANEL: CPT

## 2019-04-08 PROCEDURE — 99213 OFFICE O/P EST LOW 20 MIN: CPT | Mod: PBBFAC,PO | Performed by: INTERNAL MEDICINE

## 2019-04-08 PROCEDURE — 85651 RBC SED RATE NONAUTOMATED: CPT

## 2019-04-08 PROCEDURE — 85025 COMPLETE CBC W/AUTO DIFF WBC: CPT

## 2019-04-08 PROCEDURE — 99999 PR PBB SHADOW E&M-EST. PATIENT-LVL III: CPT | Mod: PBBFAC,,, | Performed by: INTERNAL MEDICINE

## 2019-04-08 PROCEDURE — 86140 C-REACTIVE PROTEIN: CPT

## 2019-04-08 PROCEDURE — 99214 PR OFFICE/OUTPT VISIT, EST, LEVL IV, 30-39 MIN: ICD-10-PCS | Mod: S$PBB,,, | Performed by: INTERNAL MEDICINE

## 2019-04-08 PROCEDURE — 36415 COLL VENOUS BLD VENIPUNCTURE: CPT

## 2019-04-08 PROCEDURE — 99999 PR PBB SHADOW E&M-EST. PATIENT-LVL III: ICD-10-PCS | Mod: PBBFAC,,, | Performed by: INTERNAL MEDICINE

## 2019-04-08 ASSESSMENT — DISEASE ACTIVITY SCORE (DAS28)
TENDER_JOINTS_COUNT: 0
TOTAL_SCORE_ESR: 1.07
ESR_MM_PER_HR: 4
GLOBAL_HEALTH_SCORE: 7
SWOLLEN_JOINTS_COUNT: 0

## 2019-04-08 ASSESSMENT — ROUTINE ASSESSMENT OF PATIENT INDEX DATA (RAPID3)
PAIN SCORE: 8.5
MDHAQ FUNCTION SCORE: 1.5
PSYCHOLOGICAL DISTRESS SCORE: .2
FATIGUE SCORE: 0
AM STIFFNESS SCORE: 1, YES
PATIENT GLOBAL ASSESSMENT SCORE: 7
TOTAL RAPID3 SCORE: 6.83

## 2019-04-08 NOTE — PROGRESS NOTES
Subjective:       Patient ID: Joceline Holcomb is a 68 y.o. female.    Chief Complaint: Seronegative Rheumatoid arthritis  HPI 68-year-old female is here for follow up for seronegative rheumatoid arthritis. Diagnosed by Dr Gar.    Currently on methotrexate 10 mg a week and folic acid 1 mg daily   Previously,took Plaquenil discontinued secondary to palmar rash, sulfasalazine-incomplete response  S/P INH and pyridoxine for latent TB.    Today, pain score is 8.5/10 located in R 2nd mcp joint, aching type, continuous worse as the day progresses, worse with activity, better with rest. + joint effusion.    Osteoporosis s/p compressionfracture- on Fosamax   She denies fever, weight loss, dry eyes or mouth, photosensitivity, ulcer, raynaud's phenomenon, alopecia, dysphagia, diarrhea or blood in the stools    Review of Systems   Constitutional: Negative for fever and unexpected weight change.   HENT: Negative for ear pain and facial swelling.    Eyes: Negative for pain and redness.   Respiratory: Negative for cough and shortness of breath.    Cardiovascular: Negative for chest pain and leg swelling.   Gastrointestinal: Negative for abdominal distention and abdominal pain.   Endocrine: Negative for polyphagia and polyuria.   Genitourinary: Negative for frequency and genital sores.   Musculoskeletal: Negative for arthralgias and gait problem.   Skin: Negative for color change and rash.         Objective:     /62   Pulse 67   Ht 5' (1.524 m)   Wt 53.4 kg (117 lb 11.6 oz)   BMI 22.99 kg/m²      Physical Exam   Constitutional: She is oriented to person, place, and time and well-developed, well-nourished, and in no distress.   HENT:   Head: Normocephalic and atraumatic.   Eyes: Conjunctivae are normal. Pupils are equal, round, and reactive to light.   Neck: Neck supple.   Cardiovascular: Normal rate and regular rhythm.  Exam reveals no friction rub.    Pulmonary/Chest: Effort normal and breath sounds normal.    Abdominal: Soft. Bowel sounds are normal. She exhibits no distension.       Right Side Rheumatological Exam     Examination finds the shoulder, elbow, wrist, knee, 1st PIP, 1st MCP, 2nd PIP, 3rd PIP, 3rd MCP, 4th PIP, 4th MCP, 5th PIP and 5th MCP normal.    The patient is tender to palpation of the 2nd MCP    Left Side Rheumatological Exam     Examination finds the shoulder, elbow, wrist, knee, 1st PIP, 1st MCP, 2nd PIP, 2nd MCP, 3rd PIP, 3rd MCP, 4th PIP, 4th MCP, 5th PIP and 5th MCP normal.      Neurological: She is alert and oriented to person, place, and time.   Skin: Skin is warm and dry. No rash noted.     Psychiatric: Mood and affect normal.   Musculoskeletal:                  Lab Results   Component Value Date    WBC 4.40 04/08/2019    HGB 12.6 04/08/2019    HCT 37.8 04/08/2019    MCV 93 04/08/2019     04/08/2019     CMP  Sodium   Date Value Ref Range Status   04/08/2019 139 136 - 145 mmol/L Final     Potassium   Date Value Ref Range Status   04/08/2019 3.4 (L) 3.5 - 5.1 mmol/L Final     Chloride   Date Value Ref Range Status   04/08/2019 104 95 - 110 mmol/L Final     CO2   Date Value Ref Range Status   04/08/2019 30 (H) 23 - 29 mmol/L Final     Glucose   Date Value Ref Range Status   04/08/2019 93 70 - 110 mg/dL Final     BUN, Bld   Date Value Ref Range Status   04/08/2019 14 8 - 23 mg/dL Final     Creatinine   Date Value Ref Range Status   04/08/2019 0.8 0.5 - 1.4 mg/dL Final     Calcium   Date Value Ref Range Status   04/08/2019 9.5 8.7 - 10.5 mg/dL Final     Total Protein   Date Value Ref Range Status   04/08/2019 5.8 (L) 6.0 - 8.4 g/dL Final     Albumin   Date Value Ref Range Status   04/08/2019 3.6 3.5 - 5.2 g/dL Final     Total Bilirubin   Date Value Ref Range Status   04/08/2019 0.3 0.1 - 1.0 mg/dL Final     Comment:     For infants and newborns, interpretation of results should be based  on gestational age, weight and in agreement with clinical  observations.  Premature Infant recommended  reference ranges:  Up to 24 hours.............<8.0 mg/dL  Up to 48 hours............<12.0 mg/dL  3-5 days..................<15.0 mg/dL  6-29 days.................<15.0 mg/dL       Alkaline Phosphatase   Date Value Ref Range Status   04/08/2019 85 55 - 135 U/L Final     AST   Date Value Ref Range Status   04/08/2019 14 10 - 40 U/L Final     ALT   Date Value Ref Range Status   04/08/2019 7 (L) 10 - 44 U/L Final     Anion Gap   Date Value Ref Range Status   04/08/2019 5 (L) 8 - 16 mmol/L Final     eGFR if    Date Value Ref Range Status   04/08/2019 >60 >60 mL/min/1.73 m^2 Final     eGFR if non    Date Value Ref Range Status   04/08/2019 >60 >60 mL/min/1.73 m^2 Final     Comment:     Calculation used to obtain the estimated glomerular filtration  rate (eGFR) is the CKD-EPI equation.        Lab Results   Component Value Date    SEDRATE 4 04/08/2019     Lab Results   Component Value Date    CRP 4.2 04/08/2019       Assessment:   Seronegative  rheumatoid arthritis DAS28 1.07-on MTX   Latent TB-S/PINH and pyridoxine   Osteoporosis-ON  fosamax  Osteoarthritis of bilateral knees  Long-term use of methotrexate  Immunization counseling   Plan:   Increase methotrexate to 15mg per week  Reviewed toxicity monitoring labs  Continue folic acid 1 mg a day  Continue fosamax  Take vitamin-D 800 units daily  Counseled to update on immunizations  Counseled for fall prevention    RTC in 3months with labs

## 2019-04-10 RX ORDER — ERGOCALCIFEROL 1.25 MG/1
50000 CAPSULE ORAL
Qty: 12 CAPSULE | Refills: 0 | Status: SHIPPED | OUTPATIENT
Start: 2019-04-10 | End: 2019-08-05 | Stop reason: ALTCHOICE

## 2019-04-10 NOTE — PROGRESS NOTES
Refill Authorization Note     is requesting a refill authorization.    Brief assessment and rationale for refill: APPROVE: prr  Name and strength of medication: ergocalciferol (ERGOCALCIFEROL) 50,000 unit Cap       Medication Therapy Plan: VIT D - lco(08/2018-Melinda); Labs-wnl; Approve 3 more months     Medication reconciliation completed: No              How patient will take medication: t1c po qw          Comments:   Last Vitamin D: >20ng/mL (12 months)  Lab Results   Component Value Date    [id number] 38 11/13/2018    [id number] 22 (L) 01/22/2018    [id number] 51 09/21/2016        Last Calcium (12 months)  Lab Results   Component Value Date    CALCIUM 9.5 04/08/2019    CALCIUM 9.4 11/13/2018    CALCIUM 10.0 07/16/2018        APPOINTMENTS (past 12 m or future 3m authorizing provider)  LAST VISIT DATE  Jacki Rivero MD 11/20/2018         NEXT VISIT DATE  Jacki Rivero MD 6/14/2019

## 2019-04-18 DIAGNOSIS — M06.041 RHEUMATOID ARTHRITIS INVOLVING BOTH HANDS WITH NEGATIVE RHEUMATOID FACTOR: ICD-10-CM

## 2019-04-18 DIAGNOSIS — M06.042 RHEUMATOID ARTHRITIS INVOLVING BOTH HANDS WITH NEGATIVE RHEUMATOID FACTOR: ICD-10-CM

## 2019-04-18 RX ORDER — FOLIC ACID 1 MG/1
TABLET ORAL
Qty: 90 TABLET | Refills: 2 | Status: SHIPPED | OUTPATIENT
Start: 2019-04-18 | End: 2019-08-05 | Stop reason: SDUPTHER

## 2019-04-27 DIAGNOSIS — M06.042 RHEUMATOID ARTHRITIS INVOLVING BOTH HANDS WITH NEGATIVE RHEUMATOID FACTOR: ICD-10-CM

## 2019-04-27 DIAGNOSIS — M06.041 RHEUMATOID ARTHRITIS INVOLVING BOTH HANDS WITH NEGATIVE RHEUMATOID FACTOR: ICD-10-CM

## 2019-04-29 ENCOUNTER — TELEPHONE (OUTPATIENT)
Dept: RHEUMATOLOGY | Facility: CLINIC | Age: 69
End: 2019-04-29

## 2019-04-29 DIAGNOSIS — G89.4 CHRONIC PAIN DISORDER: ICD-10-CM

## 2019-04-29 RX ORDER — METHOTREXATE 2.5 MG/1
15 TABLET ORAL
Qty: 20 TABLET | Refills: 0 | Status: SHIPPED | OUTPATIENT
Start: 2019-04-29 | End: 2019-08-05 | Stop reason: SDUPTHER

## 2019-04-29 NOTE — TELEPHONE ENCOUNTER
----- Message from Amara Horne sent at 4/29/2019 12:53 PM CDT -----  Contact: self   Patient need a refill on HYDROcodone-acetaminophen  5-325 mg 30 day supply please send to [facility], any questions please call back at [phone] (home)     [facility] - [place], [place] - [address]  [address]  [facility] [address]  Phone: [phone] Fax: [phone]

## 2019-04-29 NOTE — TELEPHONE ENCOUNTER
LOV: 2/20/19  Next appt: 6/14/19  UDS: 5/23/18  Pain contract signed: 3/6/18  Last filled: 3/27/19

## 2019-04-29 NOTE — TELEPHONE ENCOUNTER
----- Message from Deena Whitfield sent at 4/29/2019  9:59 AM CDT -----  Contact: [facility]  Type:  Pharmacy Calling to Clarify an RX    Name of Caller:  Ophelia  Pharmacy Name:  [facility] Prescription Name:  methotrexate  What do they need to clarify?: need clarification for rx  directions  Best Call Back Number:  [phone] Additional Information:

## 2019-05-01 RX ORDER — HYDROCODONE BITARTRATE AND ACETAMINOPHEN 5; 325 MG/1; MG/1
1 TABLET ORAL EVERY 6 HOURS PRN
Qty: 120 TABLET | Refills: 0 | Status: SHIPPED | OUTPATIENT
Start: 2019-05-01 | End: 2019-06-03 | Stop reason: SDUPTHER

## 2019-05-10 RX ORDER — ALENDRONATE SODIUM 70 MG/1
TABLET ORAL
Qty: 12 TABLET | Refills: 0 | Status: SHIPPED | OUTPATIENT
Start: 2019-05-10 | End: 2019-08-05 | Stop reason: SDUPTHER

## 2019-05-27 DIAGNOSIS — M06.042 RHEUMATOID ARTHRITIS INVOLVING BOTH HANDS WITH NEGATIVE RHEUMATOID FACTOR: ICD-10-CM

## 2019-05-27 DIAGNOSIS — M06.041 RHEUMATOID ARTHRITIS INVOLVING BOTH HANDS WITH NEGATIVE RHEUMATOID FACTOR: ICD-10-CM

## 2019-05-28 DIAGNOSIS — Z79.631 LONG TERM METHOTREXATE USER: Primary | ICD-10-CM

## 2019-05-28 DIAGNOSIS — M06.042 RHEUMATOID ARTHRITIS INVOLVING BOTH HANDS WITH NEGATIVE RHEUMATOID FACTOR: ICD-10-CM

## 2019-05-28 DIAGNOSIS — M06.041 RHEUMATOID ARTHRITIS INVOLVING BOTH HANDS WITH NEGATIVE RHEUMATOID FACTOR: ICD-10-CM

## 2019-05-28 RX ORDER — METHOTREXATE 2.5 MG/1
15 TABLET ORAL
Qty: 20 TABLET | Refills: 0 | Status: CANCELLED | OUTPATIENT
Start: 2019-05-28

## 2019-05-28 RX ORDER — METHOTREXATE 2.5 MG/1
TABLET ORAL
Qty: 20 TABLET | Refills: 0 | OUTPATIENT
Start: 2019-05-28

## 2019-05-31 ENCOUNTER — PATIENT OUTREACH (OUTPATIENT)
Dept: ADMINISTRATIVE | Facility: HOSPITAL | Age: 69
End: 2019-05-31

## 2019-05-31 DIAGNOSIS — G89.4 CHRONIC PAIN DISORDER: ICD-10-CM

## 2019-05-31 RX ORDER — HYDROCODONE BITARTRATE AND ACETAMINOPHEN 5; 325 MG/1; MG/1
1 TABLET ORAL EVERY 6 HOURS PRN
Qty: 120 TABLET | Refills: 0 | OUTPATIENT
Start: 2019-05-31

## 2019-05-31 NOTE — TELEPHONE ENCOUNTER
LOV: 2/20/19  Next appt: 6/14/19   UDS: 5/23/18  Pain contract signed: 3/6/18  Last filled: 5/1/19

## 2019-05-31 NOTE — TELEPHONE ENCOUNTER
----- Message from Guevara Pineda sent at 5/31/2019  8:33 AM CDT -----  Contact: Patient  Type:  RX Refill Request    Who Called:  Patient  Refill or New Rx:  Refill  RX Name and Strength:  HYDROcodone-acetaminophen (NORCO) 5-325 mg per tablet  How is the patient currently taking it? (ex. 1XDay):  x4 daily  Is this a 30 day or 90 day RX:  30  Preferred Pharmacy with phone number:    [facility] - [place] - [address]  [address]  [facility] [address]  Phone: [phone] Fax: [phone]  Local or Mail Order:  Local  Ordering Provider:  Josefa Carlos Call Back Number:  [phone]  Additional Information:  Please advise patient when refill is sent

## 2019-06-03 DIAGNOSIS — G89.4 CHRONIC PAIN DISORDER: ICD-10-CM

## 2019-06-03 NOTE — TELEPHONE ENCOUNTER
----- Message from Alma Painter sent at 6/3/2019 11:00 AM CDT -----  Contact: Lewis  Type:  RX Refill Request    Who Called:  patient  Refill or New Rx:  refill  RX Name and Strength:  HYDROcodone-acetaminophen (NORCO) 5-325 mg per tablet  How is the patient currently taking it? (ex. 1XDay):  Take 1 tablet by mouth every 6 (six) hours as needed for Pain  Is this a 30 day or 90 day RX:  30  Preferred Pharmacy with phone number:    [id number] - [place], [place] - [address]  [address]  [place] [address]  Phone: [phone] Fax: [phone]  Local or Mail Order:  local  Ordering Provider:  Dr Jacki Rivero  Best Call Back Number:  [phone]  Additional Information:   States called Friday for refill request. Thanks!

## 2019-06-04 RX ORDER — HYDROCODONE BITARTRATE AND ACETAMINOPHEN 5; 325 MG/1; MG/1
1 TABLET ORAL EVERY 6 HOURS PRN
Qty: 120 TABLET | Refills: 0 | Status: SHIPPED | OUTPATIENT
Start: 2019-06-04 | End: 2019-07-03 | Stop reason: SDUPTHER

## 2019-07-03 DIAGNOSIS — G89.4 CHRONIC PAIN DISORDER: ICD-10-CM

## 2019-07-03 RX ORDER — HYDROCODONE BITARTRATE AND ACETAMINOPHEN 5; 325 MG/1; MG/1
1 TABLET ORAL EVERY 6 HOURS PRN
Qty: 120 TABLET | Refills: 0 | Status: SHIPPED | OUTPATIENT
Start: 2019-07-03 | End: 2019-08-05 | Stop reason: SDUPTHER

## 2019-07-03 NOTE — TELEPHONE ENCOUNTER
----- Message from Deena Whitfield sent at 7/3/2019  1:46 PM CDT -----  Type:  RX Refill Request    Who Called:  Self   Refill or New Rx:  Refill   RX Name and Strength:  Hydrocodone   How is the patient currently taking it? (ex. 1XDay):  Every 6 ;hrs or as needed  Is this a 30 day or 90 day RX:  30 day supply   Preferred Pharmacy with phone number:  [facility] file  Local or Mail Order:  Local   Ordering Provider:  Dr Rivero   [facility] Call Back Number:  [phone]  Additional Information:  Patient was not able to see the doctor last month due to total knee replacement on 06/11/2019

## 2019-07-03 NOTE — TELEPHONE ENCOUNTER
LOV: 2/20/19  Next appt: 8/5/19   UDS: 4/8/19  Pain contract signed: 3/6/18  Last filled: 6/4/19    Pt had appt 6/14/19, but canceled due to knee replacement surgery on 6/11/19. Patient states she did not receive any pain medication from the ortho surgeon and paperwork stating that is being faxed to us. Will put on your desk when it arrives. Rescheduled with you 8/5/19 and patient asks for refill until she can get here.

## 2019-07-22 ENCOUNTER — PATIENT OUTREACH (OUTPATIENT)
Dept: ADMINISTRATIVE | Facility: HOSPITAL | Age: 69
End: 2019-07-22

## 2019-08-05 ENCOUNTER — OFFICE VISIT (OUTPATIENT)
Dept: FAMILY MEDICINE | Facility: CLINIC | Age: 69
End: 2019-08-05
Payer: MEDICARE

## 2019-08-05 ENCOUNTER — LAB VISIT (OUTPATIENT)
Dept: LAB | Facility: HOSPITAL | Age: 69
End: 2019-08-05
Attending: FAMILY MEDICINE
Payer: MEDICARE

## 2019-08-05 VITALS
SYSTOLIC BLOOD PRESSURE: 110 MMHG | WEIGHT: 110.44 LBS | TEMPERATURE: 98 F | BODY MASS INDEX: 21.68 KG/M2 | RESPIRATION RATE: 12 BRPM | HEART RATE: 65 BPM | DIASTOLIC BLOOD PRESSURE: 70 MMHG | HEIGHT: 60 IN | OXYGEN SATURATION: 98 %

## 2019-08-05 DIAGNOSIS — E55.9 VITAMIN D DEFICIENCY: ICD-10-CM

## 2019-08-05 DIAGNOSIS — Z87.891 PERSONAL HISTORY OF NICOTINE DEPENDENCE: ICD-10-CM

## 2019-08-05 DIAGNOSIS — M06.042 RHEUMATOID ARTHRITIS INVOLVING BOTH HANDS WITH NEGATIVE RHEUMATOID FACTOR: ICD-10-CM

## 2019-08-05 DIAGNOSIS — I10 HYPERTENSION, WELL CONTROLLED: ICD-10-CM

## 2019-08-05 DIAGNOSIS — Z79.631 LONG TERM METHOTREXATE USER: ICD-10-CM

## 2019-08-05 DIAGNOSIS — G89.4 CHRONIC PAIN DISORDER: ICD-10-CM

## 2019-08-05 DIAGNOSIS — M25.50 ARTHRALGIA, UNSPECIFIED JOINT: ICD-10-CM

## 2019-08-05 DIAGNOSIS — Z12.9 SCREENING FOR CANCER: ICD-10-CM

## 2019-08-05 DIAGNOSIS — Z22.7 LATENT TUBERCULOSIS BY BLOOD TEST: ICD-10-CM

## 2019-08-05 DIAGNOSIS — M81.0 AGE RELATED OSTEOPOROSIS, UNSPECIFIED PATHOLOGICAL FRACTURE PRESENCE: Primary | ICD-10-CM

## 2019-08-05 DIAGNOSIS — Z12.11 ENCOUNTER FOR FIT (FECAL IMMUNOCHEMICAL TEST) SCREENING: ICD-10-CM

## 2019-08-05 DIAGNOSIS — M06.041 RHEUMATOID ARTHRITIS INVOLVING BOTH HANDS WITH NEGATIVE RHEUMATOID FACTOR: ICD-10-CM

## 2019-08-05 LAB
25(OH)D3+25(OH)D2 SERPL-MCNC: 32 NG/ML (ref 30–96)
ALBUMIN SERPL BCP-MCNC: 3.9 G/DL (ref 3.5–5.2)
ALP SERPL-CCNC: 108 U/L (ref 55–135)
ALT SERPL W/O P-5'-P-CCNC: 9 U/L (ref 10–44)
AMP D-AMPHETAMINE 1000 NG/ML: NEGATIVE
ANION GAP SERPL CALC-SCNC: 9 MMOL/L (ref 8–16)
AST SERPL-CCNC: 18 U/L (ref 10–40)
BAR SECOBARBITAL 300 NG/ML: NEGATIVE
BASOPHILS # BLD AUTO: 0.08 K/UL (ref 0–0.2)
BASOPHILS NFR BLD: 1.7 % (ref 0–1.9)
BILIRUB SERPL-MCNC: 0.4 MG/DL (ref 0.1–1)
BUN SERPL-MCNC: 10 MG/DL (ref 8–23)
BUP BUPRENORPHINE 10 NG/ML: NEGATIVE
BZO OXAZEPAM 300 NG/ML: NEGATIVE
CALCIUM SERPL-MCNC: 9.9 MG/DL (ref 8.7–10.5)
CHLORIDE SERPL-SCNC: 103 MMOL/L (ref 95–110)
CHOLEST SERPL-MCNC: 192 MG/DL (ref 120–199)
CHOLEST/HDLC SERPL: 3.4 {RATIO} (ref 2–5)
CO2 SERPL-SCNC: 29 MMOL/L (ref 23–29)
COC BENZOYLECGONINE 300 NG/ML: NEGATIVE
CREAT SERPL-MCNC: 0.8 MG/DL (ref 0.5–1.4)
DIFFERENTIAL METHOD: ABNORMAL
EOSINOPHIL # BLD AUTO: 0.1 K/UL (ref 0–0.5)
EOSINOPHIL NFR BLD: 1.3 % (ref 0–8)
ERYTHROCYTE [DISTWIDTH] IN BLOOD BY AUTOMATED COUNT: 13.8 % (ref 11.5–14.5)
EST. GFR  (AFRICAN AMERICAN): >60 ML/MIN/1.73 M^2
EST. GFR  (NON AFRICAN AMERICAN): >60 ML/MIN/1.73 M^2
GLUCOSE SERPL-MCNC: 84 MG/DL (ref 70–110)
HCT VFR BLD AUTO: 42.8 % (ref 37–48.5)
HDLC SERPL-MCNC: 57 MG/DL (ref 40–75)
HDLC SERPL: 29.7 % (ref 20–50)
HGB BLD-MCNC: 13.6 G/DL (ref 12–16)
IMM GRANULOCYTES # BLD AUTO: 0.02 K/UL (ref 0–0.04)
IMM GRANULOCYTES NFR BLD AUTO: 0.4 % (ref 0–0.5)
LDLC SERPL CALC-MCNC: 113.2 MG/DL (ref 63–159)
LYMPHOCYTES # BLD AUTO: 1.5 K/UL (ref 1–4.8)
LYMPHOCYTES NFR BLD: 30.6 % (ref 18–48)
MAMP D-METHAMPHETAMINE 1000 NG/ML: NEGATIVE
MCH RBC QN AUTO: 31.4 PG (ref 27–31)
MCHC RBC AUTO-ENTMCNC: 31.8 G/DL (ref 32–36)
MCV RBC AUTO: 99 FL (ref 82–98)
MONOCYTES # BLD AUTO: 0.4 K/UL (ref 0.3–1)
MONOCYTES NFR BLD: 8.8 % (ref 4–15)
MOP MORPHINE 300 NG/ML: POSITIVE
MTD METHADONE 300 NG/ML: NEGATIVE
NEUTROPHILS # BLD AUTO: 2.7 K/UL (ref 1.8–7.7)
NEUTROPHILS NFR BLD: 57.2 % (ref 38–73)
NONHDLC SERPL-MCNC: 135 MG/DL
NRBC BLD-RTO: 0 /100 WBC
PLATELET # BLD AUTO: 301 K/UL (ref 150–350)
PMV BLD AUTO: 11.9 FL (ref 9.2–12.9)
POTASSIUM SERPL-SCNC: 3.3 MMOL/L (ref 3.5–5.1)
PROT SERPL-MCNC: 6.6 G/DL (ref 6–8.4)
QXY OXYCODONE 100 NG/ML: POSITIVE
RBC # BLD AUTO: 4.33 M/UL (ref 4–5.4)
SODIUM SERPL-SCNC: 141 MMOL/L (ref 136–145)
THC 11-NOR-9-TETRAHYDROCANNABINOL-9-CARBOXYLIC ACID: POSITIVE
TRIGL SERPL-MCNC: 109 MG/DL (ref 30–150)
WBC # BLD AUTO: 4.77 K/UL (ref 3.9–12.7)

## 2019-08-05 PROCEDURE — 99999 PR PBB SHADOW E&M-EST. PATIENT-LVL III: CPT | Mod: PBBFAC,,, | Performed by: FAMILY MEDICINE

## 2019-08-05 PROCEDURE — 99213 OFFICE O/P EST LOW 20 MIN: CPT | Mod: PBBFAC,PO | Performed by: FAMILY MEDICINE

## 2019-08-05 PROCEDURE — 99999 PR PBB SHADOW E&M-EST. PATIENT-LVL III: ICD-10-PCS | Mod: PBBFAC,,, | Performed by: FAMILY MEDICINE

## 2019-08-05 PROCEDURE — 99214 OFFICE O/P EST MOD 30 MIN: CPT | Mod: S$PBB,,, | Performed by: FAMILY MEDICINE

## 2019-08-05 PROCEDURE — 80053 COMPREHEN METABOLIC PANEL: CPT

## 2019-08-05 PROCEDURE — 80305 DRUG TEST PRSMV DIR OPT OBS: CPT | Mod: PBBFAC,PO | Performed by: FAMILY MEDICINE

## 2019-08-05 PROCEDURE — 80307 DRUG TEST PRSMV CHEM ANLYZR: CPT

## 2019-08-05 PROCEDURE — 80061 LIPID PANEL: CPT

## 2019-08-05 PROCEDURE — 36415 COLL VENOUS BLD VENIPUNCTURE: CPT | Mod: PO

## 2019-08-05 PROCEDURE — 85025 COMPLETE CBC W/AUTO DIFF WBC: CPT

## 2019-08-05 PROCEDURE — 82306 VITAMIN D 25 HYDROXY: CPT

## 2019-08-05 PROCEDURE — 99214 PR OFFICE/OUTPT VISIT, EST, LEVL IV, 30-39 MIN: ICD-10-PCS | Mod: S$PBB,,, | Performed by: FAMILY MEDICINE

## 2019-08-05 RX ORDER — FOLIC ACID 1 MG/1
TABLET ORAL
Qty: 90 TABLET | Refills: 3 | Status: SHIPPED | OUTPATIENT
Start: 2019-08-05 | End: 2023-08-25

## 2019-08-05 RX ORDER — METHOTREXATE 2.5 MG/1
15 TABLET ORAL
Qty: 75 TABLET | Refills: 0 | Status: SHIPPED | OUTPATIENT
Start: 2019-08-05 | End: 2019-11-02 | Stop reason: SDUPTHER

## 2019-08-05 RX ORDER — HYDROCODONE BITARTRATE AND ACETAMINOPHEN 5; 325 MG/1; MG/1
1 TABLET ORAL EVERY 6 HOURS PRN
Qty: 120 TABLET | Refills: 0 | Status: SHIPPED | OUTPATIENT
Start: 2019-09-03 | End: 2023-08-25

## 2019-08-05 RX ORDER — HYDROCODONE BITARTRATE AND ACETAMINOPHEN 5; 325 MG/1; MG/1
1 TABLET ORAL EVERY 6 HOURS PRN
Qty: 120 TABLET | Refills: 0 | Status: SHIPPED | OUTPATIENT
Start: 2019-08-05 | End: 2019-11-04 | Stop reason: ALTCHOICE

## 2019-08-05 RX ORDER — HYDROCODONE BITARTRATE AND ACETAMINOPHEN 5; 325 MG/1; MG/1
1 TABLET ORAL EVERY 6 HOURS PRN
Qty: 120 TABLET | Refills: 0 | Status: SHIPPED | OUTPATIENT
Start: 2019-10-01 | End: 2019-11-04 | Stop reason: ALTCHOICE

## 2019-08-05 RX ORDER — ALENDRONATE SODIUM 70 MG/1
70 TABLET ORAL
Qty: 12 TABLET | Refills: 3 | Status: SHIPPED | OUTPATIENT
Start: 2019-08-05 | End: 2023-08-25

## 2019-08-05 NOTE — PROGRESS NOTES
Subjective:       Patient ID: Joceline Holcomb is a 68 y.o. female.    Chief Complaint: No chief complaint on file.    HPI  Review of Systems   Constitutional: Negative for fatigue and unexpected weight change.   Respiratory: Negative for chest tightness and shortness of breath.    Cardiovascular: Negative for chest pain, palpitations and leg swelling.   Gastrointestinal: Negative for abdominal pain.   Musculoskeletal: Positive for arthralgias and back pain.   Neurological: Negative for dizziness, syncope, light-headedness and headaches.       Patient Active Problem List   Diagnosis    Rheumatoid arthritis involving both hands with negative rheumatoid factor    Osteoporosis    Hypertension, well controlled    Long term methotrexate user    Vitamin D deficiency    Marijuana use    Compression fracture of vertebra with routine healing 11/17    Latent tuberculosis by blood test    Opioid type dependence, continuous use -uds 5/23/2018 and contract 2/8/18    Underweight    BMI 21.0-21.9, adult     Patient is here for a chronic conditions follow up.    Rheum schedule 10/19    Had left tkr 8 weeks ago and doing pt .  Has slightl limitation of extenson  Objective:      Physical Exam   Constitutional: She is oriented to person, place, and time. She appears well-developed and well-nourished.   Cardiovascular: Normal rate, regular rhythm and normal heart sounds.   Pulmonary/Chest: Effort normal and breath sounds normal.   Musculoskeletal: She exhibits no edema.   Neurological: She is alert and oriented to person, place, and time.   Skin: Skin is warm and dry.   Psychiatric: She has a normal mood and affect.   Nursing note and vitals reviewed.      Assessment:       1. Age related osteoporosis, unspecified pathological fracture presence    2. Chronic pain disorder    3. Rheumatoid arthritis involving both hands with negative rheumatoid factor    4. Encounter for FIT (fecal immunochemical test) screening    5.  Hypertension, well controlled    6. Vitamin D deficiency    7. Arthralgia, unspecified joint     8. Screening for cancer    9. Long term methotrexate user    10. Latent tuberculosis by blood test    11. Personal history of nicotine dependence         Plan:     1. Chronic pain disorder  Controlled on current medications.  Continue current medications.  Counseled patient on habit forming potential of opiates, risk of sedation, respiratory suppression or arrest especially if used in conjunction with benzodiazepines or alcohol.  Counseled patient to avoid driving while on the medication, rules of the pain contract and need for routine 3 month follow ups.  Patient agrees to all aspects of the plan of care and wishes to proceed with therapy.  The plan is always to use alternatives less habit forming, to utilize interventions and therapies that reduce pain as an adjunctive treatment, and limit and wean the dose of narcotics as soon as able and appropriate.      - HYDROcodone-acetaminophen (NORCO) 5-325 mg per tablet; Take 1 tablet by mouth every 6 (six) hours as needed for Pain.  Dispense: 120 tablet; Refill: 0  - POCT Urine Drug Screen Pain Management  - Pain Clinic Drug Screen    2. Rheumatoid arthritis involving both hands with negative rheumatoid factor  Cont and proceed with establishing care with new rheum.    - methotrexate 2.5 MG Tab; Take 6 tablets (15 mg total) by mouth every 7 days.  Dispense: 75 tablet; Refill: 0  - folic acid (FOLVITE) 1 MG tablet; TAKE 1 TABLET(1 MG) BY MOUTH EVERY DAY  Dispense: 90 tablet; Refill: 3  - HYDROcodone-acetaminophen (NORCO) 5-325 mg per tablet; Take 1 tablet by mouth every 6 (six) hours as needed for Pain.  Dispense: 120 tablet; Refill: 0  - HYDROcodone-acetaminophen (NORCO) 5-325 mg per tablet; Take 1 tablet by mouth every 6 (six) hours as needed for Pain.  Dispense: 120 tablet; Refill: 0  - POCT Urine Drug Screen Pain Management  - Pain Clinic Drug Screen    3. Age related  osteoporosis, unspecified pathological fracture presence  Stable condition.  Continue current medications.  Will adjust based on lab findings or if condition changes.    - alendronate (FOSAMAX) 70 MG tablet; Take 1 tablet (70 mg total) by mouth every 7 days.  Dispense: 12 tablet; Refill: 3    4. Encounter for FIT (fecal immunochemical test) screening  Patient declines a colonoscopy after discussing benefits and risks. Patient is willing to do FOBT x3  or FIT test at home understanding it is 4 times less effective at detecting cancers/masses/polyps than a screening colonoscopy    - Fecal Immunochemical Test (iFOBT); Future    5. Hypertension, well controlled  Controlled on current medications.  Continue current medications.    - CBC auto differential; Future  - Comprehensive metabolic panel; Future  - Lipid panel; Future    6. Vitamin D deficiency  Screen and treat as indicated:    - Vitamin D; Future    7. Arthralgia, unspecified joint   Screen and treat as indicated:    - POCT Urine Drug Screen Pain Management    8. Screening for cancer  Screen and treat as indicated:    - CT Chest Lung Screening Low Dose; Future    9. Long term methotrexate user  Screen and treat as indicated:    - CT Chest Lung Screening Low Dose; Future    10. Latent tuberculosis by blood test  No sx. Screen and treat as indicated:    - CT Chest Lung Screening Low Dose; Future    11. Personal history of nicotine dependence   Screen and treat as indicated:    - CT Chest Lung Screening Low Dose; Future      Time spent with patient: 20 minutes    Patient with be reevaluated in 3 and  6 months or sooner prn    Greater than 50% of this visit was spent counseling as described in above documentation:Yes

## 2019-08-06 ENCOUNTER — PATIENT OUTREACH (OUTPATIENT)
Dept: ADMINISTRATIVE | Facility: HOSPITAL | Age: 69
End: 2019-08-06

## 2019-08-06 ENCOUNTER — TELEPHONE (OUTPATIENT)
Dept: ADMINISTRATIVE | Facility: HOSPITAL | Age: 69
End: 2019-08-06

## 2019-08-06 DIAGNOSIS — E87.6 HYPOKALEMIA: Primary | ICD-10-CM

## 2019-08-06 RX ORDER — POTASSIUM CHLORIDE 750 MG/1
10 TABLET, EXTENDED RELEASE ORAL DAILY
Qty: 90 TABLET | Refills: 3 | Status: SHIPPED | OUTPATIENT
Start: 2019-08-06 | End: 2019-11-04

## 2019-08-08 ENCOUNTER — HOSPITAL ENCOUNTER (OUTPATIENT)
Dept: RADIOLOGY | Facility: HOSPITAL | Age: 69
Discharge: HOME | End: 2019-08-08
Attending: FAMILY MEDICINE
Payer: MEDICARE

## 2019-08-08 DIAGNOSIS — Z22.7 LATENT TUBERCULOSIS BY BLOOD TEST: ICD-10-CM

## 2019-08-08 DIAGNOSIS — Z87.891 PERSONAL HISTORY OF NICOTINE DEPENDENCE: ICD-10-CM

## 2019-08-08 DIAGNOSIS — Z12.9 SCREENING FOR CANCER: ICD-10-CM

## 2019-08-08 DIAGNOSIS — Z79.631 LONG TERM METHOTREXATE USER: ICD-10-CM

## 2019-08-08 LAB
6MAM UR QL: NOT DETECTED
7AMINOCLONAZEPAM UR QL: NOT DETECTED
A-OH ALPRAZ UR QL: NOT DETECTED
ALPRAZ UR QL: NOT DETECTED
AMPHET UR QL SCN: NOT DETECTED
BARBITURATES UR QL: NOT DETECTED
BUPRENORPHINE UR QL: NOT DETECTED
BZE UR QL: NOT DETECTED
CARBOXYTHC UR QL: PRESENT
CARISOPRODOL UR QL: NOT DETECTED
CLONAZEPAM UR QL: NOT DETECTED
CODEINE UR QL: NOT DETECTED
CREAT UR-MCNC: 57.8 MG/DL (ref 20–400)
DIAZEPAM UR QL: NOT DETECTED
ETHYL GLUCURONIDE UR QL: NOT DETECTED
FENTANYL UR QL: NOT DETECTED
HYDROCODONE UR QL: PRESENT
HYDROMORPHONE UR QL: NOT DETECTED
LORAZEPAM UR QL: NOT DETECTED
MDA UR QL: NOT DETECTED
MDEA UR QL: NOT DETECTED
MDMA UR QL: NOT DETECTED
ME-PHENIDATE UR QL: NOT DETECTED
MEPERIDINE UR QL: NOT DETECTED
METHADONE UR QL: NOT DETECTED
METHAMPHET UR QL: NOT DETECTED
MIDAZOLAM UR QL SCN: NOT DETECTED
MORPHINE UR QL: NOT DETECTED
NORBUPRENORPHINE UR QL CFM: NOT DETECTED
NORDIAZEPAM UR QL: NOT DETECTED
NORFENTANYL UR QL: NOT DETECTED
NORHYDROCODONE UR QL CFM: PRESENT
NOROXYCODONE UR QL CFM: NOT DETECTED
NOROXYMORPHONE: NOT DETECTED
OXAZEPAM UR QL: NOT DETECTED
OXYCODONE UR QL: NOT DETECTED
OXYMORPHONE UR QL: NOT DETECTED
PATHOLOGY STUDY: NORMAL
PCP UR QL: NOT DETECTED
PHENTERMINE UR QL: NOT DETECTED
PROPOXYPH UR QL: NOT DETECTED
SERVICE CMNT-IMP: NORMAL
TAPENTADOL UR QL SCN: NOT DETECTED
TAPENTADOL-O-SULF: NOT DETECTED
TEMAZEPAM UR QL: NOT DETECTED
TRAMADOL UR QL: NOT DETECTED
ZOLPIDEM UR QL: NOT DETECTED

## 2019-08-08 PROCEDURE — G0297 LDCT FOR LUNG CA SCREEN: HCPCS | Mod: TC,PN

## 2019-08-08 PROCEDURE — G0297 CT CHEST LUNG SCREENING LOW DOSE: ICD-10-PCS | Mod: 26,,, | Performed by: RADIOLOGY

## 2019-08-08 PROCEDURE — G0297 LDCT FOR LUNG CA SCREEN: HCPCS | Mod: 26,,, | Performed by: RADIOLOGY

## 2019-08-15 ENCOUNTER — LAB VISIT (OUTPATIENT)
Dept: LAB | Facility: HOSPITAL | Age: 69
End: 2019-08-15
Attending: FAMILY MEDICINE
Payer: MEDICARE

## 2019-08-15 DIAGNOSIS — Z12.11 ENCOUNTER FOR FIT (FECAL IMMUNOCHEMICAL TEST) SCREENING: ICD-10-CM

## 2019-08-15 PROCEDURE — 82274 ASSAY TEST FOR BLOOD FECAL: CPT

## 2019-08-20 LAB — HEMOCCULT STL QL IA: NEGATIVE

## 2019-09-06 ENCOUNTER — TELEPHONE (OUTPATIENT)
Dept: FAMILY MEDICINE | Facility: CLINIC | Age: 69
End: 2019-09-06

## 2019-09-06 NOTE — TELEPHONE ENCOUNTER
Patient has been sent certified letter to notify her that Dr. Rivero will no longer fill her pain medication due to failed drug test.

## 2019-09-06 NOTE — TELEPHONE ENCOUNTER
----- Message from Viri Nesbitt sent at 9/6/2019  1:20 PM CDT -----  Contact: patient   Type:  RX Refill Request    Who Called: patient   Refill or New Rx:  refill  RX Name and Strength:  HYDROcodone-acetaminophen (NORCO) 5-325 mg per tablet  Preferred Pharmacy with phone number:   [id number] - [place], [place] - [address]  [address]  [facility] [address]  Phone: [phone] Fax: [phone]  Best Call Back Number:  [phone] (home)

## 2019-10-03 DIAGNOSIS — I10 HYPERTENSION, WELL CONTROLLED: ICD-10-CM

## 2019-10-06 RX ORDER — LOSARTAN POTASSIUM AND HYDROCHLOROTHIAZIDE 12.5; 5 MG/1; MG/1
TABLET ORAL
Qty: 90 TABLET | Refills: 3 | Status: SHIPPED | OUTPATIENT
Start: 2019-10-06 | End: 2019-11-04

## 2019-11-02 DIAGNOSIS — M06.041 RHEUMATOID ARTHRITIS INVOLVING BOTH HANDS WITH NEGATIVE RHEUMATOID FACTOR: ICD-10-CM

## 2019-11-02 DIAGNOSIS — M06.042 RHEUMATOID ARTHRITIS INVOLVING BOTH HANDS WITH NEGATIVE RHEUMATOID FACTOR: ICD-10-CM

## 2019-11-03 RX ORDER — METHOTREXATE 2.5 MG/1
TABLET ORAL
Qty: 75 TABLET | Refills: 1 | Status: SHIPPED | OUTPATIENT
Start: 2019-11-03 | End: 2020-04-30

## 2019-11-04 ENCOUNTER — OFFICE VISIT (OUTPATIENT)
Dept: FAMILY MEDICINE | Facility: CLINIC | Age: 69
End: 2019-11-04
Payer: MEDICARE

## 2019-11-04 ENCOUNTER — LAB VISIT (OUTPATIENT)
Dept: LAB | Facility: HOSPITAL | Age: 69
End: 2019-11-04
Payer: MEDICARE

## 2019-11-04 VITALS
DIASTOLIC BLOOD PRESSURE: 72 MMHG | TEMPERATURE: 99 F | HEIGHT: 60 IN | SYSTOLIC BLOOD PRESSURE: 108 MMHG | HEART RATE: 72 BPM | BODY MASS INDEX: 21.25 KG/M2 | OXYGEN SATURATION: 97 % | WEIGHT: 108.25 LBS

## 2019-11-04 DIAGNOSIS — M06.041 RHEUMATOID ARTHRITIS INVOLVING BOTH HANDS WITH NEGATIVE RHEUMATOID FACTOR: ICD-10-CM

## 2019-11-04 DIAGNOSIS — E87.6 HYPOKALEMIA: ICD-10-CM

## 2019-11-04 DIAGNOSIS — F11.20 OPIOID TYPE DEPENDENCE, CONTINUOUS USE: Primary | ICD-10-CM

## 2019-11-04 DIAGNOSIS — Z23 FLU VACCINE NEED: ICD-10-CM

## 2019-11-04 DIAGNOSIS — F12.90 MARIJUANA USE: ICD-10-CM

## 2019-11-04 DIAGNOSIS — M06.042 RHEUMATOID ARTHRITIS INVOLVING BOTH HANDS WITH NEGATIVE RHEUMATOID FACTOR: ICD-10-CM

## 2019-11-04 DIAGNOSIS — M81.0 AGE RELATED OSTEOPOROSIS, UNSPECIFIED PATHOLOGICAL FRACTURE PRESENCE: ICD-10-CM

## 2019-11-04 DIAGNOSIS — I10 HYPERTENSION, WELL CONTROLLED: ICD-10-CM

## 2019-11-04 PROCEDURE — 99999 PR PBB SHADOW E&M-EST. PATIENT-LVL IV: ICD-10-PCS | Mod: PBBFAC,,, | Performed by: NURSE PRACTITIONER

## 2019-11-04 PROCEDURE — 99213 OFFICE O/P EST LOW 20 MIN: CPT | Mod: S$PBB,,, | Performed by: NURSE PRACTITIONER

## 2019-11-04 PROCEDURE — 99213 PR OFFICE/OUTPT VISIT, EST, LEVL III, 20-29 MIN: ICD-10-PCS | Mod: S$PBB,,, | Performed by: NURSE PRACTITIONER

## 2019-11-04 PROCEDURE — 90662 IIV NO PRSV INCREASED AG IM: CPT | Mod: PBBFAC,PO

## 2019-11-04 PROCEDURE — 99214 OFFICE O/P EST MOD 30 MIN: CPT | Mod: PBBFAC,PO | Performed by: NURSE PRACTITIONER

## 2019-11-04 PROCEDURE — 99999 PR PBB SHADOW E&M-EST. PATIENT-LVL IV: CPT | Mod: PBBFAC,,, | Performed by: NURSE PRACTITIONER

## 2019-11-04 RX ORDER — LOSARTAN POTASSIUM 50 MG/1
50 TABLET ORAL DAILY
Refills: 3 | COMMUNITY
Start: 2019-10-11

## 2019-11-04 RX ORDER — HYDROCHLOROTHIAZIDE 12.5 MG/1
TABLET ORAL
Refills: 3 | COMMUNITY
Start: 2019-10-11 | End: 2023-08-25

## 2019-11-04 NOTE — PROGRESS NOTES
Subjective:       Patient ID: Joceline Holcomb is a 69 y.o. female.    Chief Complaint: Follow-up    Ms. Holcomb is a 69-year-old female patient who presents today for a chronic condition follow-up.  She is new to me.  Recent drug screen in August 2019 positive again for THC resulting in the violation of her pain contract agreement and the contract was terminated.  Reports she was given CBD oil by a friend that was labeled incorrectly.  She did not know it was positive for THC.  Has enough Norco through this month.  No complaints aside from back pain today.  Last labs showed hypokalemia.  Reports she is unable to tolerate potassium supplement due to GI upset and muscle cramping.  Vital stable.  She is agreeable to the flu vaccine.      Patient Active Problem List   Diagnosis    Rheumatoid arthritis involving both hands with negative rheumatoid factor    Osteoporosis    Hypertension, well controlled    Long term methotrexate user    Vitamin D deficiency    Marijuana use    Compression fracture of vertebra with routine healing 11/17    Latent tuberculosis by blood test    Opioid type dependence, continuous use -contract terminated 9/19 due to repeat failure of UDS + THC    Underweight    BMI 21.0-21.9, adult       Current Outpatient Medications:     alendronate (FOSAMAX) 70 MG tablet, Take 1 tablet (70 mg total) by mouth every 7 days., Disp: 12 tablet, Rfl: 3    folic acid (FOLVITE) 1 MG tablet, TAKE 1 TABLET(1 MG) BY MOUTH EVERY DAY, Disp: 90 tablet, Rfl: 3    hydroCHLOROthiazide (HYDRODIURIL) 12.5 MG Tab, TK ONE T PO D, Disp: , Rfl: 3    HYDROcodone-acetaminophen (NORCO) 5-325 mg per tablet, Take 1 tablet by mouth every 6 (six) hours as needed for Pain., Disp: 120 tablet, Rfl: 0    losartan (COZAAR) 50 MG tablet, TK 1 T PO QD, Disp: , Rfl: 3    methotrexate 2.5 MG Tab, TAKE 6 TABLETS(15 MG) BY MOUTH EVERY 7 DAYS, Disp: 75 tablet, Rfl: 1    Lab Results   Component Value Date    WBC 4.77 08/05/2019     HGB 13.6 08/05/2019    HCT 42.8 08/05/2019     08/05/2019    CHOL 192 08/05/2019    TRIG 109 08/05/2019    HDL 57 08/05/2019    ALT 9 (L) 08/05/2019    AST 18 08/05/2019     08/05/2019    K 3.3 (L) 08/05/2019     08/05/2019    CREATININE 0.8 08/05/2019    BUN 10 08/05/2019    CO2 29 08/05/2019    TSH 2.000 01/04/2013    INR 0.9 11/11/2007    HGBA1C 5.6 11/12/2007     Review of Systems   Constitutional: Negative for fatigue and unexpected weight change.   Respiratory: Negative for chest tightness and shortness of breath.    Cardiovascular: Negative for chest pain, palpitations and leg swelling.   Gastrointestinal: Negative for abdominal pain.   Musculoskeletal: Positive for arthralgias and back pain.   Neurological: Negative for dizziness, syncope, light-headedness and headaches.       Objective:      Physical Exam   Constitutional: She is oriented to person, place, and time. Vital signs are normal. She appears well-developed and well-nourished. No distress.   Cardiovascular: Normal rate, regular rhythm and normal heart sounds.   Pulmonary/Chest: Effort normal and breath sounds normal. She has no wheezes.   Abdominal: Soft. Normal appearance.   Musculoskeletal: She exhibits no edema.   Neurological: She is alert and oriented to person, place, and time.   Skin: Skin is warm and dry.   Psychiatric: She has a normal mood and affect.   Nursing note and vitals reviewed.      Assessment:       1. Opioid type dependence, continuous use -contract terminated 9/19 due to repeat failure of UDS + THC    2. Marijuana use    3. Rheumatoid arthritis involving both hands with negative rheumatoid factor    4. Hypertension, well controlled    5. Age related osteoporosis, unspecified pathological fracture presence    6. Hypokalemia    7. Flu vaccine need        Plan:       Joceline was seen today for follow-up.    Diagnoses and all orders for this visit:    Opioid type dependence, continuous use -contract  terminated 9/19 due to repeat failure of UDS + THC  Discussed termination of contract with the patient as determined by Dr. Rivero  Verbalized understanding  Understands she will have to seek care elsewhere in terms of pain management, however, we can continue to provide medical care  Offered pain management referral    Marijuana use  Patient reports she is no longer using THC positive CBD oil  Advised against this  See above    Rheumatoid arthritis involving both hands with negative rheumatoid factor  Rheumatology following    Hypertension, well controlled  Controlled on current regimen    Age related osteoporosis, unspecified pathological fracture presence  On Fosamax    Hypokalemia  -     Basic metabolic panel; Future  Screening treat as needed    Flu vaccine need  -     Influenza - High Dose (65+) (PF) (IM)       Follow-up as scheduled

## 2019-11-05 ENCOUNTER — LAB VISIT (OUTPATIENT)
Dept: LAB | Facility: HOSPITAL | Age: 69
End: 2019-11-05
Attending: INTERNAL MEDICINE
Payer: MEDICARE

## 2019-11-05 ENCOUNTER — OFFICE VISIT (OUTPATIENT)
Dept: RHEUMATOLOGY | Facility: CLINIC | Age: 69
End: 2019-11-05
Payer: MEDICARE

## 2019-11-05 VITALS — WEIGHT: 110.31 LBS | BODY MASS INDEX: 21.54 KG/M2 | DIASTOLIC BLOOD PRESSURE: 61 MMHG | SYSTOLIC BLOOD PRESSURE: 95 MMHG

## 2019-11-05 DIAGNOSIS — M06.042 RHEUMATOID ARTHRITIS INVOLVING BOTH HANDS WITH NEGATIVE RHEUMATOID FACTOR: ICD-10-CM

## 2019-11-05 DIAGNOSIS — Z79.631 LONG TERM METHOTREXATE USER: ICD-10-CM

## 2019-11-05 DIAGNOSIS — M06.041 RHEUMATOID ARTHRITIS INVOLVING BOTH HANDS WITH NEGATIVE RHEUMATOID FACTOR: ICD-10-CM

## 2019-11-05 DIAGNOSIS — Z79.631 LONG TERM METHOTREXATE USER: Primary | ICD-10-CM

## 2019-11-05 DIAGNOSIS — M81.0 AGE RELATED OSTEOPOROSIS, UNSPECIFIED PATHOLOGICAL FRACTURE PRESENCE: ICD-10-CM

## 2019-11-05 LAB
ALBUMIN SERPL BCP-MCNC: 4 G/DL (ref 3.5–5.2)
ALP SERPL-CCNC: 88 U/L (ref 55–135)
ALT SERPL W/O P-5'-P-CCNC: 13 U/L (ref 10–44)
ANION GAP SERPL CALC-SCNC: 9 MMOL/L (ref 8–16)
AST SERPL-CCNC: 17 U/L (ref 10–40)
BASOPHILS # BLD AUTO: 0.06 K/UL (ref 0–0.2)
BASOPHILS NFR BLD: 0.9 % (ref 0–1.9)
BILIRUB SERPL-MCNC: 0.8 MG/DL (ref 0.1–1)
BUN SERPL-MCNC: 15 MG/DL (ref 8–23)
CALCIUM SERPL-MCNC: 9.2 MG/DL (ref 8.7–10.5)
CHLORIDE SERPL-SCNC: 102 MMOL/L (ref 95–110)
CO2 SERPL-SCNC: 29 MMOL/L (ref 23–29)
CREAT SERPL-MCNC: 0.9 MG/DL (ref 0.5–1.4)
CRP SERPL-MCNC: 0.75 MG/DL (ref 0–0.75)
DIFFERENTIAL METHOD: ABNORMAL
EOSINOPHIL # BLD AUTO: 0.1 K/UL (ref 0–0.5)
EOSINOPHIL NFR BLD: 1.7 % (ref 0–8)
ERYTHROCYTE [DISTWIDTH] IN BLOOD BY AUTOMATED COUNT: 14.8 % (ref 11.5–14.5)
ERYTHROCYTE [SEDIMENTATION RATE] IN BLOOD BY WESTERGREN METHOD: 6 MM/HR (ref 0–20)
EST. GFR  (AFRICAN AMERICAN): >60 ML/MIN/1.73 M^2
EST. GFR  (NON AFRICAN AMERICAN): >60 ML/MIN/1.73 M^2
GLUCOSE SERPL-MCNC: 94 MG/DL (ref 70–110)
HCT VFR BLD AUTO: 41.3 % (ref 37–48.5)
HGB BLD-MCNC: 13.5 G/DL (ref 12–16)
IMM GRANULOCYTES # BLD AUTO: 0.04 K/UL (ref 0–0.04)
IMM GRANULOCYTES NFR BLD AUTO: 0.6 % (ref 0–0.5)
LYMPHOCYTES # BLD AUTO: 1.9 K/UL (ref 1–4.8)
LYMPHOCYTES NFR BLD: 26.7 % (ref 18–48)
MCH RBC QN AUTO: 31.3 PG (ref 27–31)
MCHC RBC AUTO-ENTMCNC: 32.7 G/DL (ref 32–36)
MCV RBC AUTO: 96 FL (ref 82–98)
MONOCYTES # BLD AUTO: 0.6 K/UL (ref 0.3–1)
MONOCYTES NFR BLD: 9 % (ref 4–15)
NEUTROPHILS # BLD AUTO: 4.3 K/UL (ref 1.8–7.7)
NEUTROPHILS NFR BLD: 61.1 % (ref 38–73)
NRBC BLD-RTO: 0 /100 WBC
PLATELET # BLD AUTO: 258 K/UL (ref 150–350)
PMV BLD AUTO: 10.9 FL (ref 9.2–12.9)
POTASSIUM SERPL-SCNC: 3.5 MMOL/L (ref 3.5–5.1)
PROT SERPL-MCNC: 6.5 G/DL (ref 6–8.4)
RBC # BLD AUTO: 4.31 M/UL (ref 4–5.4)
SODIUM SERPL-SCNC: 140 MMOL/L (ref 136–145)
URATE SERPL-MCNC: 6 MG/DL (ref 2.4–5.7)
WBC # BLD AUTO: 7 K/UL (ref 3.9–12.7)

## 2019-11-05 PROCEDURE — 85651 RBC SED RATE NONAUTOMATED: CPT

## 2019-11-05 PROCEDURE — 36415 COLL VENOUS BLD VENIPUNCTURE: CPT

## 2019-11-05 PROCEDURE — 99203 PR OFFICE/OUTPT VISIT, NEW, LEVL III, 30-44 MIN: ICD-10-PCS | Mod: S$GLB,,, | Performed by: INTERNAL MEDICINE

## 2019-11-05 PROCEDURE — 80053 COMPREHEN METABOLIC PANEL: CPT

## 2019-11-05 PROCEDURE — 86140 C-REACTIVE PROTEIN: CPT

## 2019-11-05 PROCEDURE — 85025 COMPLETE CBC W/AUTO DIFF WBC: CPT

## 2019-11-05 PROCEDURE — 99203 OFFICE O/P NEW LOW 30 MIN: CPT | Mod: S$GLB,,, | Performed by: INTERNAL MEDICINE

## 2019-11-05 PROCEDURE — 84550 ASSAY OF BLOOD/URIC ACID: CPT

## 2019-11-05 RX ORDER — ERGOCALCIFEROL 1.25 MG/1
CAPSULE ORAL
Qty: 8 CAPSULE | Refills: 1 | Status: SHIPPED | OUTPATIENT
Start: 2019-11-05 | End: 2020-02-13

## 2019-11-05 NOTE — PROGRESS NOTES
[facility] RHEUMATOLOGY        NEW PATIENT      Subjective:       Patient ID:   NAME: Joceline Holcomb : 1950     69 y.o. female    Referring Doc: No ref. provider found  Other Physicians:    Chief Complaint:  Rheumatoid Arthritis      History of Present Illness:     New patient with hx seronegative RA since her early 50's  Initially on MTX and Plaquenil.Now only on MTX  15 mg / wk( for last few yrs)Was on prednisone  Initially for a few yrs  Doing well at present  No hx serositis  AM stiffness lasting 30-45 mins  Arthral;gias but no joint swelling  ROS:   GEN: no fevers night sweats or significant weight changes   + sl  fatigue  HEENT: no HA's, changes in vision , no mouth ulcers, + sicca symptoms, only mouth on and off, no scalp tenderness, jaw claudication  CV: no CP, SOB, PND, TRUJILLO or orthopnea,no palpitations  PULM:no SOB, cough, hemoptysis, sputum or pleuritic pain  GI: no abdominal pain, nausea, vomiting, constipation, diarrhea, melanotic stools, BRBPR, or hematemesis, no dysphagia  : no hematuria, dysuria  NEURO:no paresthesias, headaches, visual disturbances, muscle weakness  SKIN:  no rashes , erythema, bruising, or swelling, no Raynauds, no photosensitivity  MUSCULOSKELETAL:no joint swelling, no prolonged AM stiffness, + low back and neck on and off back pain   PSYCH:   + Insomnia,  - depression, - anxiety    Medications:    Current Outpatient Medications:     alendronate (FOSAMAX) 70 MG tablet, Take 1 tablet (70 mg total) by mouth every 7 days., Disp: 12 tablet, Rfl: 3    folic acid (FOLVITE) 1 MG tablet, TAKE 1 TABLET(1 MG) BY MOUTH EVERY DAY, Disp: 90 tablet, Rfl: 3    hydroCHLOROthiazide (HYDRODIURIL) 12.5 MG Tab, TK ONE T PO D, Disp: , Rfl: 3    HYDROcodone-acetaminophen (NORCO) 5-325 mg per tablet, Take 1 tablet by mouth every 6 (six) hours as needed for Pain., Disp: 120 tablet, Rfl: 0    losartan (COZAAR) 50 MG tablet, TK 1 T PO QD, Disp: , Rfl: 3    methotrexate 2.5 MG Tab, TAKE  6 TABLETS(15 MG) BY MOUTH EVERY 7 DAYS, Disp: 75 tablet, Rfl: 1    FAMILY HISTORY: negative for Connective Tissue Disease  Mother RA    PAST MEDICAL HISTORY:  Osteoporosis.Was on Prolia.Took for 6 yrs.Last one in 2009  On Fosamax for last few months  Took meds for +TB last yr,for approx 6 months  RA  HTN    PAST SURGICAL HISTORY:  Viktor THR  Plate l leg ,from fracture  C/S  Hysterectomy  Bilateral wrist surgeries sec fractures  L TKR  Appendectomy  SOCIAL HISTORY:  Smoked until 45 yrs ago  ETOH : no  On disability since 1975  ALLERGIES:  NKDA        Objective:     Vitals:  Blood pressure 95/61, weight 50 kg (110 lb 4.8 oz).    Physical Examination:   GEN: no apparent distress, comfortable; AAOx3  SKIN: no rashes, no lesions, no sclerodactyly or induration, no Raynaud's, no periungual erythema  HEAD: normal  EYES: no pallor, no icterus, PERRLA  ENT:  no thrush,no mucosal dryness or ulcerations  NECK: no masses, thyroid normal, trachea midline, no LAD/LN's, supple  CV:   S1 and S2 regular, no murmurs, gallop or rubs  CHEST: Normal respiratory effort;  normal breath sounds; no rubs, no wheezes, no crackles.   ABDOM: nontender and nondistended; soft; ; no rebound/guarding,no masses  MUSC/Skeletal: ROM normal; no crepitus; joints without synovitis, + deformities R wrist,mild swan neck deformities  EXTREM: no clubbing, cyanosis, edema, normal pulses.  NEURO: grossly intact; motorWNL; AAOx3; no tremors  PSYCH: normal mood, affect and behavior  LYMPH: normal cervical, supraclavicular            Labs:   @RESUFAST(WBC,HGB,HCT,MCV,PLT)  )@RESUFAST(NA,K,CL,CO2,GLU,BUN,Creatinine,Calcium,PROT,Albumin,Bilitot,Alkphos,AST,ALT,JOSE,Sed Rate,CRP,RF,CCP)      Radiology/Diagnostic Studies:    I have reviewed all available labs and XRay reports    Assessment/Plan:   69 y.o. female with history of rheumatoid arthritis.  This seems stable  2) severe osteoporosis.  Most recent vitamin-D in low normal range.  She is not very compliant with  vitamin-D supplementation We changed to vitamin-D 93995 units once a week for 8 weeks and then 1 a month.   Follow-up in 3 months; will do blood work at that time and recheck vitamin-D      PLAN:  CBC,CMP,CRP today        Discussion:     I have explained all of the above in detail and the patient understands all of the current recommendation(s). I have answered all of their questions to the best of my ability and to their complete satisfaction.      I have reviewed the risks and benefits of the medication in detail with patient, who understands and wishes to proceed. Printed information regarding the disease and/or medication was also provided.        RTC 3 months        Electronically signed by Komal Grace MD

## 2020-02-13 RX ORDER — ERGOCALCIFEROL 1.25 MG/1
CAPSULE ORAL
Qty: 8 CAPSULE | Refills: 1 | Status: SHIPPED | OUTPATIENT
Start: 2020-02-13 | End: 2023-08-25

## 2020-04-16 RX ORDER — ERGOCALCIFEROL 1.25 MG/1
CAPSULE ORAL
Qty: 8 CAPSULE | Refills: 1 | OUTPATIENT
Start: 2020-04-16

## 2020-04-29 DIAGNOSIS — M06.042 RHEUMATOID ARTHRITIS INVOLVING BOTH HANDS WITH NEGATIVE RHEUMATOID FACTOR: ICD-10-CM

## 2020-04-29 DIAGNOSIS — M06.041 RHEUMATOID ARTHRITIS INVOLVING BOTH HANDS WITH NEGATIVE RHEUMATOID FACTOR: ICD-10-CM

## 2020-04-30 RX ORDER — METHOTREXATE 2.5 MG/1
TABLET ORAL
Qty: 75 TABLET | Refills: 0 | Status: SHIPPED | OUTPATIENT
Start: 2020-04-30 | End: 2023-08-25

## 2020-04-30 NOTE — TELEPHONE ENCOUNTER
Called and spoke to patient regarding Rx refill and follow up appointment. Patient was informed that she will need a follow up appointment prior to any additional refills. Patient states that she will call back to scheduled follow up appointment.

## 2020-09-04 DIAGNOSIS — Z12.11 COLON CANCER SCREENING: ICD-10-CM

## 2020-09-23 ENCOUNTER — PATIENT OUTREACH (OUTPATIENT)
Dept: ADMINISTRATIVE | Facility: HOSPITAL | Age: 70
End: 2020-09-23

## 2020-09-23 DIAGNOSIS — Z12.31 ENCOUNTER FOR SCREENING MAMMOGRAM FOR MALIGNANT NEOPLASM OF BREAST: Primary | ICD-10-CM

## 2020-09-23 NOTE — PROGRESS NOTES
Reached out to patient regarding overdue mammogram.  She declined to schedule at this time because she does not have a vehicle or a way to get to the clinic.

## 2023-05-05 NOTE — TELEPHONE ENCOUNTER
----- Message from Rhonda Barrett MD sent at 5/28/2019 11:30 AM CDT -----  Needs labs for refill of MTX.   SS   Pt called rx line at 155pm requesting refill on [facility] [address] [place]. Pt uses [facility]. OK For refill? Thanks, CG

## 2023-08-11 NOTE — TELEPHONE ENCOUNTER
Clinic Care Coordination Contact  Care Team Conversations    Patients hemoglobin and lead lab results complete, called mother to inform her of these.  Also notified mom that [facility] headstart form was completed and faxed today.  Mariajose very thankful, agreeable to CC reaching out on a later date to touch base, see if she called to get patient on list for autism eval.     Virginia GOMEZ RN, Pediatric Care Coordinator  [facility]  [phone]       Pt should have refills. I sent 5 refills in [facility].t

## 2023-08-25 ENCOUNTER — HOSPITAL ENCOUNTER (EMERGENCY)
Facility: HOSPITAL | Age: 73
Discharge: HOME | End: 2023-08-25
Attending: EMERGENCY MEDICINE | Admitting: INTERNAL MEDICINE
Payer: MEDICARE

## 2023-08-25 VITALS
HEART RATE: 79 BPM | SYSTOLIC BLOOD PRESSURE: 163 MMHG | HEIGHT: 59 IN | WEIGHT: 100 LBS | OXYGEN SATURATION: 98 % | RESPIRATION RATE: 18 BRPM | DIASTOLIC BLOOD PRESSURE: 87 MMHG | BODY MASS INDEX: 20.16 KG/M2 | TEMPERATURE: 98 F

## 2023-08-25 DIAGNOSIS — S73.005A HIP DISLOCATION, LEFT: ICD-10-CM

## 2023-08-25 DIAGNOSIS — Z01.818 PRE-OP EVALUATION: ICD-10-CM

## 2023-08-25 PROBLEM — T84.029A DISLOCATION OF HIP JOINT PROSTHESIS: Status: ACTIVE | Noted: 2023-08-25

## 2023-08-25 PROBLEM — Z96.649 DISLOCATION OF HIP JOINT PROSTHESIS: Status: ACTIVE | Noted: 2023-08-25

## 2023-08-25 LAB
25(OH)D3+25(OH)D2 SERPL-MCNC: 23 NG/ML (ref 30–96)
ABO + RH BLD: NORMAL
ALBUMIN SERPL BCP-MCNC: 3.9 G/DL (ref 3.5–5.2)
ALP SERPL-CCNC: 68 U/L (ref 55–135)
ALT SERPL W/O P-5'-P-CCNC: 10 U/L (ref 10–44)
ANION GAP SERPL CALC-SCNC: 11 MMOL/L (ref 8–16)
AST SERPL-CCNC: 19 U/L (ref 10–40)
BASOPHILS # BLD AUTO: 0.04 K/UL (ref 0–0.2)
BASOPHILS NFR BLD: 0.5 % (ref 0–1.9)
BILIRUB SERPL-MCNC: 0.9 MG/DL (ref 0.1–1)
BLD GP AB SCN CELLS X3 SERPL QL: NORMAL
BUN SERPL-MCNC: 8 MG/DL (ref 8–23)
CALCIUM SERPL-MCNC: 8.6 MG/DL (ref 8.7–10.5)
CHLORIDE SERPL-SCNC: 111 MMOL/L (ref 95–110)
CO2 SERPL-SCNC: 20 MMOL/L (ref 23–29)
CREAT SERPL-MCNC: 0.7 MG/DL (ref 0.5–1.4)
DIFFERENTIAL METHOD: ABNORMAL
EOSINOPHIL # BLD AUTO: 0 K/UL (ref 0–0.5)
EOSINOPHIL NFR BLD: 0.5 % (ref 0–8)
ERYTHROCYTE [DISTWIDTH] IN BLOOD BY AUTOMATED COUNT: 14.6 % (ref 11.5–14.5)
EST. GFR  (NO RACE VARIABLE): >60 ML/MIN/1.73 M^2
GLUCOSE SERPL-MCNC: 107 MG/DL (ref 70–110)
HCT VFR BLD AUTO: 49.1 % (ref 37–48.5)
HCV AB SERPL QL IA: NORMAL
HGB BLD-MCNC: 16.3 G/DL (ref 12–16)
HIV 1+2 AB+HIV1 P24 AG SERPL QL IA: NORMAL
IMM GRANULOCYTES # BLD AUTO: 0.03 K/UL (ref 0–0.04)
IMM GRANULOCYTES NFR BLD AUTO: 0.4 % (ref 0–0.5)
INR PPP: 1 (ref 0.8–1.2)
LYMPHOCYTES # BLD AUTO: 1.5 K/UL (ref 1–4.8)
LYMPHOCYTES NFR BLD: 17.2 % (ref 18–48)
MAGNESIUM SERPL-MCNC: 1.9 MG/DL (ref 1.6–2.6)
MCH RBC QN AUTO: 29.7 PG (ref 27–31)
MCHC RBC AUTO-ENTMCNC: 33.2 G/DL (ref 32–36)
MCV RBC AUTO: 90 FL (ref 82–98)
MONOCYTES # BLD AUTO: 0.5 K/UL (ref 0.3–1)
MONOCYTES NFR BLD: 6.3 % (ref 4–15)
NEUTROPHILS # BLD AUTO: 6.4 K/UL (ref 1.8–7.7)
NEUTROPHILS NFR BLD: 75.1 % (ref 38–73)
NRBC BLD-RTO: 0 /100 WBC
PHOSPHATE SERPL-MCNC: 1.7 MG/DL (ref 2.7–4.5)
PLATELET # BLD AUTO: 227 K/UL (ref 150–450)
PMV BLD AUTO: 11.2 FL (ref 9.2–12.9)
POTASSIUM SERPL-SCNC: 3.3 MMOL/L (ref 3.5–5.1)
PREALB SERPL-MCNC: 18 MG/DL (ref 20–43)
PROT SERPL-MCNC: 6.5 G/DL (ref 6–8.4)
PROTHROMBIN TIME: 10.7 SEC (ref 9–12.5)
RBC # BLD AUTO: 5.48 M/UL (ref 4–5.4)
SODIUM SERPL-SCNC: 142 MMOL/L (ref 136–145)
SPECIMEN OUTDATE: NORMAL
WBC # BLD AUTO: 8.45 K/UL (ref 3.9–12.7)

## 2023-08-25 PROCEDURE — 85610 PROTHROMBIN TIME: CPT

## 2023-08-25 PROCEDURE — 93010 EKG 12-LEAD: ICD-10-PCS | Mod: ,,, | Performed by: INTERNAL MEDICINE

## 2023-08-25 PROCEDURE — 63600175 PHARM REV CODE 636 W HCPCS

## 2023-08-25 PROCEDURE — 93010 ELECTROCARDIOGRAM REPORT: CPT | Mod: ,,, | Performed by: INTERNAL MEDICINE

## 2023-08-25 PROCEDURE — 87389 HIV-1 AG W/HIV-1&-2 AB AG IA: CPT | Performed by: PHYSICIAN ASSISTANT

## 2023-08-25 PROCEDURE — 25000003 PHARM REV CODE 250

## 2023-08-25 PROCEDURE — 93005 ELECTROCARDIOGRAM TRACING: CPT

## 2023-08-25 PROCEDURE — 96374 THER/PROPH/DIAG INJ IV PUSH: CPT

## 2023-08-25 PROCEDURE — 96361 HYDRATE IV INFUSION ADD-ON: CPT

## 2023-08-25 PROCEDURE — 86803 HEPATITIS C AB TEST: CPT | Performed by: PHYSICIAN ASSISTANT

## 2023-08-25 PROCEDURE — 82306 VITAMIN D 25 HYDROXY: CPT | Performed by: PHYSICIAN ASSISTANT

## 2023-08-25 PROCEDURE — 84100 ASSAY OF PHOSPHORUS: CPT | Performed by: PHYSICIAN ASSISTANT

## 2023-08-25 PROCEDURE — 99285 EMERGENCY DEPT VISIT HI MDM: CPT | Mod: 25

## 2023-08-25 PROCEDURE — 27265 TREAT HIP DISLOCATION: CPT | Mod: LT

## 2023-08-25 PROCEDURE — 12000002 HC ACUTE/MED SURGE SEMI-PRIVATE ROOM

## 2023-08-25 PROCEDURE — 99152 MOD SED SAME PHYS/QHP 5/>YRS: CPT

## 2023-08-25 PROCEDURE — 85025 COMPLETE CBC W/AUTO DIFF WBC: CPT

## 2023-08-25 PROCEDURE — 83735 ASSAY OF MAGNESIUM: CPT | Performed by: PHYSICIAN ASSISTANT

## 2023-08-25 PROCEDURE — 80053 COMPREHEN METABOLIC PANEL: CPT

## 2023-08-25 PROCEDURE — 86900 BLOOD TYPING SEROLOGIC ABO: CPT

## 2023-08-25 PROCEDURE — 63600175 PHARM REV CODE 636 W HCPCS: Performed by: EMERGENCY MEDICINE

## 2023-08-25 PROCEDURE — 84134 ASSAY OF PREALBUMIN: CPT | Performed by: PHYSICIAN ASSISTANT

## 2023-08-25 PROCEDURE — 96375 TX/PRO/DX INJ NEW DRUG ADDON: CPT

## 2023-08-25 RX ORDER — AMLODIPINE BESYLATE 5 MG/1
5 TABLET ORAL DAILY
Status: DISCONTINUED | OUTPATIENT
Start: 2023-08-25 | End: 2023-08-25 | Stop reason: HOSPADM

## 2023-08-25 RX ORDER — ONDANSETRON 8 MG/1
8 TABLET, ORALLY DISINTEGRATING ORAL EVERY 8 HOURS PRN
Status: DISCONTINUED | OUTPATIENT
Start: 2023-08-25 | End: 2023-08-25 | Stop reason: HOSPADM

## 2023-08-25 RX ORDER — POTASSIUM CHLORIDE 20 MEQ/1
40 TABLET, EXTENDED RELEASE ORAL ONCE
Status: DISCONTINUED | OUTPATIENT
Start: 2023-08-25 | End: 2023-08-25 | Stop reason: HOSPADM

## 2023-08-25 RX ORDER — HYDRALAZINE HYDROCHLORIDE 25 MG/1
25 TABLET, FILM COATED ORAL EVERY 8 HOURS PRN
Status: DISCONTINUED | OUTPATIENT
Start: 2023-08-25 | End: 2023-08-25 | Stop reason: HOSPADM

## 2023-08-25 RX ORDER — MORPHINE SULFATE 4 MG/ML
4 INJECTION, SOLUTION INTRAMUSCULAR; INTRAVENOUS
Status: COMPLETED | OUTPATIENT
Start: 2023-08-25 | End: 2023-08-25

## 2023-08-25 RX ORDER — ONDANSETRON 2 MG/ML
4 INJECTION INTRAMUSCULAR; INTRAVENOUS EVERY 8 HOURS PRN
Status: DISCONTINUED | OUTPATIENT
Start: 2023-08-25 | End: 2023-08-25 | Stop reason: HOSPADM

## 2023-08-25 RX ORDER — ONDANSETRON 2 MG/ML
4 INJECTION INTRAMUSCULAR; INTRAVENOUS
Status: COMPLETED | OUTPATIENT
Start: 2023-08-25 | End: 2023-08-25

## 2023-08-25 RX ORDER — PROPOFOL 10 MG/ML
200 VIAL (ML) INTRAVENOUS ONCE
Status: COMPLETED | OUTPATIENT
Start: 2023-08-25 | End: 2023-08-25

## 2023-08-25 RX ORDER — ACETAMINOPHEN 325 MG/1
650 TABLET ORAL EVERY 6 HOURS PRN
Status: DISCONTINUED | OUTPATIENT
Start: 2023-08-25 | End: 2023-08-25 | Stop reason: HOSPADM

## 2023-08-25 RX ORDER — ASPIRIN 81 MG/1
81 TABLET ORAL DAILY
COMMUNITY

## 2023-08-25 RX ORDER — HYDROCODONE BITARTRATE AND ACETAMINOPHEN 5; 325 MG/1; MG/1
1 TABLET ORAL EVERY 6 HOURS PRN
Status: DISCONTINUED | OUTPATIENT
Start: 2023-08-25 | End: 2023-08-25 | Stop reason: HOSPADM

## 2023-08-25 RX ORDER — HYDROCODONE BITARTRATE AND ACETAMINOPHEN 10; 325 MG/1; MG/1
1 TABLET ORAL EVERY 6 HOURS PRN
Status: DISCONTINUED | OUTPATIENT
Start: 2023-08-25 | End: 2023-08-25 | Stop reason: HOSPADM

## 2023-08-25 RX ORDER — HYDROCODONE BITARTRATE AND ACETAMINOPHEN 7.5; 325 MG/1; MG/1
1 TABLET ORAL EVERY 6 HOURS PRN
Status: DISCONTINUED | OUTPATIENT
Start: 2023-08-25 | End: 2023-08-25 | Stop reason: HOSPADM

## 2023-08-25 RX ORDER — ERGOCALCIFEROL 1.25 MG/1
50000 CAPSULE ORAL
Status: DISCONTINUED | OUTPATIENT
Start: 2023-08-25 | End: 2023-08-25 | Stop reason: HOSPADM

## 2023-08-25 RX ORDER — HYDROMORPHONE HYDROCHLORIDE 1 MG/ML
0.5 INJECTION, SOLUTION INTRAMUSCULAR; INTRAVENOUS; SUBCUTANEOUS EVERY 4 HOURS PRN
Status: DISCONTINUED | OUTPATIENT
Start: 2023-08-25 | End: 2023-08-25 | Stop reason: HOSPADM

## 2023-08-25 RX ORDER — ONDANSETRON 4 MG/1
4 TABLET, FILM COATED ORAL EVERY 6 HOURS
Qty: 20 TABLET | Refills: 0 | Status: SHIPPED | OUTPATIENT
Start: 2023-08-25

## 2023-08-25 RX ORDER — LOSARTAN POTASSIUM 50 MG/1
100 TABLET ORAL DAILY
Status: DISCONTINUED | OUTPATIENT
Start: 2023-08-26 | End: 2023-08-25 | Stop reason: HOSPADM

## 2023-08-25 RX ORDER — SODIUM CHLORIDE 0.9 % (FLUSH) 0.9 %
3 SYRINGE (ML) INJECTION EVERY 8 HOURS
Status: DISCONTINUED | OUTPATIENT
Start: 2023-08-25 | End: 2023-08-25 | Stop reason: HOSPADM

## 2023-08-25 RX ORDER — AMLODIPINE BESYLATE 5 MG/1
5 TABLET ORAL DAILY
COMMUNITY

## 2023-08-25 RX ADMIN — PROPOFOL 20 MG: 10 INJECTION, EMULSION INTRAVENOUS at 10:08

## 2023-08-25 RX ADMIN — POTASSIUM BICARBONATE 40 MEQ: 391 TABLET, EFFERVESCENT ORAL at 09:08

## 2023-08-25 RX ADMIN — ONDANSETRON 4 MG: 2 INJECTION INTRAMUSCULAR; INTRAVENOUS at 08:08

## 2023-08-25 RX ADMIN — ONDANSETRON 4 MG: 2 INJECTION INTRAMUSCULAR; INTRAVENOUS at 10:08

## 2023-08-25 RX ADMIN — MORPHINE SULFATE 4 MG: 4 INJECTION INTRAVENOUS at 08:08

## 2023-08-25 RX ADMIN — PROPOFOL 40 MG: 10 INJECTION, EMULSION INTRAVENOUS at 10:08

## 2023-08-25 RX ADMIN — SODIUM CHLORIDE, POTASSIUM CHLORIDE, SODIUM LACTATE AND CALCIUM CHLORIDE 500 ML: 600; 310; 30; 20 INJECTION, SOLUTION INTRAVENOUS at 08:08
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
## 2023-08-25 NOTE — DISCHARGE INSTRUCTIONS
He was seen in the emergency department today because the dislocated knee or hip.  We consulted Orthopedic surgery and they performed a hip abduction with us in the room this morning.    He will need to call your own orthopedic surgeon and scheduled appointment with them in order to have continual follow-up for your hip dislocation.     Please return to the ED if your hip dislocates again. Please always wear your brace to prevent further dislocations.

## 2023-08-25 NOTE — ED NOTES
LOC: The patient is awake, alert and aware of environment with an appropriate affect, the patient is oriented x 3 and speaking appropriately.  APPEARANCE: patient is clean and well groomed, patient's clothing is properly fastened.  SKIN: The skin is warm and dry, color consistent with ethnicity, patient has normal skin turgor and moist mucus membranes, skin intact, no breakdown or bruising noted.  MUSCULOSKELETAL: no obvious swelling or deformities noted.  RESPIRATORY: Airway is open and patent, respirations are spontaneous, patient has a normal effort and rate  ABDOMEN: Soft and non tender to palpation, no distention noted    Patient arrives via ems from [facility] as a transfer. Patient was transferred for her left hip replacement coming out of place, patient arrives with warner cath in place draining clear yellow urine, patient is in no acute distress, placed on continuous bp and pulse ox monitoring, will continue to monitor.

## 2023-08-25 NOTE — HPI
Joceline Holcomb is a 72 y.o. lady with ** who presents with complaints of **. Patient was transferred to [facility] from [facility] for orthopedic surgery evaluation. She reports       ED: afebrile without leukocytosis. BMP with mild hypokalemia, 3.3 otherwise unremarkable. VSS.

## 2023-08-25 NOTE — SUBJECTIVE & OBJECTIVE
Past Medical History:   Diagnosis Date    Cataract     Fractured shoulder     Hypertension     Latent tuberculosis by blood test     Osteoarthritis     Osteoporosis     Rheumatoid arthritis(714.0)        Past Surgical History:   Procedure Laterality Date    CATARACT EXTRACTION Bilateral      SECTION      EYE SURGERY      cataracts    FRACTURE SURGERY      HYSTERECTOMY      KNEE ARTHROPLASTY Left     left tkr     TOTAL HIP ARTHROPLASTY      WRIST FRACTURE SURGERY         Review of patient's allergies indicates:   Allergen Reactions    Adhesive Other (See Comments)     Pt states tape removes skin       Current Facility-Administered Medications   Medication    potassium chloride SA CR tablet 40 mEq    propofol (DIPRIVAN) 10 mg/mL IVP     Current Outpatient Medications   Medication Sig    alendronate (FOSAMAX) 70 MG tablet Take 1 tablet (70 mg total) by mouth every 7 days.    ergocalciferol (ERGOCALCIFEROL) 50,000 unit Cap TAKE 1 CAPSULE BY MOUTH EVERY WEEK FOR 8 WEEKS THEN ONCE A MONTH    folic acid (FOLVITE) 1 MG tablet TAKE 1 TABLET(1 MG) BY MOUTH EVERY DAY    hydroCHLOROthiazide (HYDRODIURIL) 12.5 MG Tab TK ONE T PO D    HYDROcodone-acetaminophen (NORCO) 5-325 mg per tablet Take 1 tablet by mouth every 6 (six) hours as needed for Pain.    losartan (COZAAR) 50 MG tablet TK 1 T PO QD    methotrexate 2.5 MG Tab TAKE 6 TABLETS(15 MG) BY MOUTH EVERY 7 DAYS     Family History       Problem Relation (Age of Onset)    Cancer Paternal Grandfather    Cataracts Mother, Father    Diabetes Mother    Diabetes Mellitus Mother, Sister    Heart attack Father    Heart disease Father    Hypertension Father    Kidney disease Father    Osteoarthritis Mother    Rheum arthritis Mother          Tobacco Use    Smoking status: Former     Types: Cigarettes    Smokeless tobacco: Never   Substance and Sexual Activity    Alcohol use: No     Alcohol/week: 0.0 standard drinks of alcohol    Drug use: No    Sexual activity: Never  "    ROS  Constitutional: negative for fevers  Eyes: negative visual changes  ENT: negative for hearing loss  Respiratory: negative for dyspnea  Cardiovascular: negative for chest pain  Gastrointestinal: negative for abdominal pain  Genitourinary: negative for dysuria  Neurological: negative for headaches  Behavioral/Psych: negative for hallucinations  Endocrine: negative for temperature intolerance    Objective:     Vital Signs (Most Recent):  Temp: 98 °F (36.7 °C) (08/25/23 0757)  Pulse: 69 (08/25/23 0832)  Resp: 18 (08/25/23 0804)  BP: (!) 144/77 (08/25/23 0832)  SpO2: 96 % (08/25/23 0832) Vital Signs (24h Range):  Temp:  [98 °F (36.7 °C)] 98 °F (36.7 °C)  Pulse:  [69-81] 69  Resp:  [16-18] 18  SpO2:  [96 %-99 %] 96 %  BP: (144-168)/(77-92) 144/77     Weight: 45.4 kg (100 lb)  Height: 4' 11" (149.9 cm)  Body mass index is 20.2 kg/m².    No intake or output data in the 24 hours ending 08/25/23 1027     Ortho/SPM Exam  General:  no acute distress, appears stated age   Neuro: alert and oriented x3  Psych: normal mood  Head: normocephalic, atraumatic.  Eyes: no scleral icterus  Mouth: moist mucous membranes  Cardiovascular: extremities warm and well perfused  Lungs: breathing comfortably, equal chest rise bilat  Skin: clean, dry, intact (any exceptions noted in below musculoskeletal exam)    MSK:  RUE:  - Skin intact throughout, no open wounds  - No swelling  - No ecchymosis, erythema, or signs of cellulitis  - NonTTP throughout  - AROM and PROM of the shoulder, elbow, wrist, and hand intact without pain  - Axillary/AIN/PIN/Radial/Median/Ulnar Nerves assessed in isolation without deficit  - SILT throughout  - Compartments soft  - Radial artery palpated   - Capillary Refill <3s    LUE:  - Skin intact throughout, no open wounds  - No swelling  - No ecchymosis, erythema, or signs of cellulitis  - NonTTP throughout  - AROM and PROM of the shoulder, elbow, wrist, and hand intact without pain  - " Axillary/AIN/PIN/Radial/Median/Ulnar Nerves assessed in isolation without deficit  - SILT throughout  - Compartments soft  - Radial artery palpated   - Capillary Refill <3s    RLE:  - Skin intact throughout, no open wounds, surgical incision well healed no signs of drainage or infection  - No swelling  - No ecchymosis, erythema, or signs of cellulitis  - NonTTP throughout  - AROM and PROM of the hip, knee, ankle, and foot intact without pain  - TA/EHL/Gastroc/FHL assessed in isolation without deficit  - SILT throughout  - Compartments soft  - DP and PT palpated  - Capillary Refill <3s    LLE:  - Skin intact throughout, no open wounds, incisions from previous surgeries well healed, no signs of infection or drainage  - LLE surgical incision from ORIF tibial shaft well healed  - Mild lateral hip swelling  - No ecchymosis, erythema, or signs of cellulitis  - TTP over hip  - AROM and PROM of the ankle, and foot intact without pain  - ROM of the knee 0-45 (baseline)  - ROM of hip deferred due to known dislocated  - TA/EHL/Gastroc/FHL assessed in isolation without deficit  - SILT throughout  - Compartments soft  - DP and PT palpated  - Capillary Refill <3s       Significant Labs: All pertinent labs within the past 24 hours have been reviewed.    Significant Imaging: I have reviewed and interpreted all pertinent imaging results/findings.  XR pelvis: L hip superior and posterior prosthetic dislocation with fractured liner  R hip: primary LENNOX with good alignment, no signs of loosening, fracture, or hardware failure  XR L knee: L TKA with good alignment, no signs of loosening, fracture, or hardware failure

## 2023-08-25 NOTE — ED PROVIDER NOTES
Encounter Date: 2023       History     Chief Complaint   Patient presents with    Hip Pain     [facility] sent here for hip replacement coming out of place, no trauma, no fall, aaox3     Patient is a 72-year-old female that presents to the emergency department from [place] after dislocating her left hip on Thursday morning.  Patient states that she woke up in the morning time was rolling over when she heard a pop and felt her hip come out of place.  Patient states that she has past medical history of rheumatoid arthritis, and hypertension.  Patient does have many orthopedic procedures.  Initially many of them came from a trauma that occurred approximately 25 years ago where she broke multiple bones, hip and ribs.  She had a revision of her left hip about 10 years ago.  Patient states for about 1 week she has felt her hip squeaking like a christos door.  Patient denies current medications for rheumatoid arthritis.  Admits only taking medications for her hypertension.  She uses a walker occasionally and a cane occasionally when she needs to ambulate outside of her house, but does not feel very comfortable because of the rheumatoid arthritis in her hands.  She is unable to  walkers very well.    Patient is from [place], family is still at [place].  Reports that daughter may come to see her sometime today.  Admit allergies to adhesives otherwise no medical allergies.    The history is provided by the patient, medical records and the EMS personnel.     Review of patient's allergies indicates:   Allergen Reactions    Adhesive Other (See Comments)     Pt states tape removes skin     Past Medical History:   Diagnosis Date    Cataract     Fractured shoulder     Hypertension     Latent tuberculosis by blood test     Osteoarthritis     Osteoporosis     Rheumatoid arthritis(714.0)      Past Surgical History:   Procedure Laterality Date    CATARACT EXTRACTION Bilateral      SECTION      EYE  SURGERY      cataracts    FRACTURE SURGERY      HYSTERECTOMY      KNEE ARTHROPLASTY Left     left tkr     TOTAL HIP ARTHROPLASTY      WRIST FRACTURE SURGERY       Family History   Problem Relation Age of Onset    Diabetes Mother     Cataracts Mother     Rheum arthritis Mother     Osteoarthritis Mother     Diabetes Mellitus Mother     Cataracts Father     Heart attack Father     Kidney disease Father     Hypertension Father     Heart disease Father     Diabetes Mellitus Sister     Cancer Paternal Grandfather     Glaucoma Neg Hx     Macular degeneration Neg Hx     Retinal detachment Neg Hx     Thyroid disease Neg Hx     Stroke Neg Hx     Psoriasis Neg Hx     Lupus Neg Hx     Inflammatory bowel disease Neg Hx     Hyperlipidemia Neg Hx     COPD Neg Hx     Depression Neg Hx     Chronic back pain Neg Hx     Asthma Neg Hx     Alcohol abuse Neg Hx      Social History     Tobacco Use    Smoking status: Former     Types: Cigarettes    Smokeless tobacco: Never   Substance Use Topics    Alcohol use: No     Alcohol/week: 0.0 standard drinks of alcohol    Drug use: No     Review of Systems  ROS negative except as noted in HPI    Physical Exam     Initial Vitals   BP Pulse Resp Temp SpO2   08/25/23 0732 08/25/23 0732 08/25/23 0732 08/25/23 0757 08/25/23 0732   (!) 161/87 81 16 98 °F (36.7 °C) 98 %      MAP       --                Physical Exam  Gen: well appearing, mild distress secondary to pain, following directions, A&Ox3   Skin: no diaphoresis, cyanosis, rash on grossly inspected skin   HEENT: oral mucosa moist, oral cavity with out lesions, head non traumatic   CV: regular rate, normal rhythm, S1 and S2 appreciated, no extra heart sounds or murmurs  Pul: clear to auscultation in all lung fields, good respiratory effort, no other lung sounds appreciated   Abd: flat abdomen, soft, no abdominal masses, non-tender to palpation   Ext: Distal pulses +2 in B/L LE and UE, warm to the touch, left leg shorter than right, no  ecchymosis  Neuro: Sensation to light touch intact, no focal deficit.      ED Course   Pre-Assessment for Procedural Sedation    Date/Time: 8/25/2023 10:30 AM    Performed by: Paul Dodson DO  Authorized by: Don Mcadams MD        ASA Class::  Class 3 - Systemic Illness with functional impairment.       Mallampati Score::  Class 1 - Visualization of the soft palate, fauces, uvula, and anterior/posterior pillars.    Date/Time of last solid:  8/23/2023 8:45 PM    Contents of Last Food Intake:  Dinner    Date/Time of last fluid:  8/24/2023 2:45 AM    Contents of Last Fluid Intake:  Water    Patient/Family history of anesthesia or sedation complications: Yes    Sedation:     Sedation type: moderate (conscious) sedation      Sedation:  Propofol    Vital signs: Vital signs monitored during sedation    Procedural Sedation        Date/Time: 8/25/2023 2:47 PM    Performed by: Paul Dodson DO  Authorized by: Don Mcadams MD  ASA Class: Class 3 - Systemic Illness with functional impairment.  Mallampati Score: Class 1 - Visualization of the soft palate, fauces, uvula, and anterior/posterior pillars.   NPO STATUS:  Date/Time of last solid: 8/23/2023 8:47 PM  Contents of last solid: Dinner  Date/Time of last fluid: 8/25/2023 2:48 AM  Contents of last fluid: Water    Equipment: on cardiac monitor., on CO2 monitor., airway equipment available. and on supplemental oxygen.     Sedatives: propofol  Sedation start date/time: 8/25/2023 10:40 AM  Sedation end date/time: 8/25/2023 11:02 AM  Total Sedation Time (min): 22  Vitals: Vital signs were monitored during sedation.  Complications: No complications.   Patient/Family history of anesthesia or sedation complications: Yes      Labs Reviewed   CBC W/ AUTO DIFFERENTIAL - Abnormal; Notable for the following components:       Result Value    RBC 5.48 (*)     Hemoglobin 16.3 (*)     Hematocrit 49.1 (*)     RDW 14.6 (*)     Gran % 75.1 (*)     Lymph % 17.2 (*)     All  other components within normal limits    Narrative:     Release to patient->Immediate   COMPREHENSIVE METABOLIC PANEL - Abnormal; Notable for the following components:    Potassium 3.3 (*)     Chloride 111 (*)     CO2 20 (*)     Calcium 8.6 (*)     All other components within normal limits    Narrative:     Release to patient->Immediate   PHOSPHORUS - Abnormal; Notable for the following components:    Phosphorus 1.7 (*)     All other components within normal limits    Narrative:     add on phos and mg per  /order#[id number] and [id number] @   08/25/2023  09:57       Release to patient->Immediate   HIV 1 / 2 ANTIBODY    Narrative:     Release to patient->Immediate   HEPATITIS C ANTIBODY    Narrative:     Release to patient->Immediate   PROTIME-INR    Narrative:     Release to patient->Immediate   MAGNESIUM   PHOSPHORUS   PREALBUMIN   VITAMIN D   MAGNESIUM    Narrative:     add on phos and mg per  /order#[id number] and [id number] @   08/25/2023  09:57       Release to patient->Immediate   TYPE & SCREEN     EKG Readings: (Independently Interpreted)   Normal sinus rhythm with evidence of right bundle-branch block     ECG Results              EKG 12-lead (Final result)  Result time 08/25/23 12:25:54      Final result by Interface, Lab In [facility] (08/25/23 12:25:54)                   Narrative:    Test Reason : Z01.818,    Vent. Rate : 075 BPM     Atrial Rate : 075 BPM     P-R Int : 158 ms          QRS Dur : 118 ms      QT Int : 446 ms       P-R-T Axes : 077 052 068 degrees     QTc Int : 498 ms    Normal sinus rhythm  Right bundle branch block  Abnormal ECG  When compared with ECG of 11-NOV-2007 13:01,  No significant change was found  Confirmed by Fidelina Bob MD ([id number]) on 8/25/2023 12:25:46 PM    Referred By: VIRY LAO           Confirmed By:Fidelina Bob MD                                  Imaging Results              X-Ray Hip 2 or 3 views Left (with Pelvis when performed) (Final result)   Result time 08/25/23 11:27:47      Final result by Michael Morrissey Jr., MD (08/25/23 11:27:47)                   Impression:      See above      Electronically signed by: Michael Morrissey MD  Date:    08/25/2023  Time:    11:27               Narrative:    EXAMINATION:  XR HIP WITH PELVIS WHEN PERFORMED, 2 OR 3 VIEWS LEFT    CLINICAL HISTORY:  Post reduction;    TECHNIQUE:  AP view of the pelvis and frog leg lateral view of the left hip were performed.    COMPARISON:  Left hip August 25, 2023    FINDINGS:  Postreduction left femoral head replacement male resides within the acetabular cup on both the AP and lateral projections.  Previously suspected fracture of the acetabular cup component obscured by the overlying femoral hardware.  Bones are demineralized.  Right hip replacement similar.                                       X-Ray Tibia Fibula 2 View Left (Final result)  Result time 08/25/23 11:22:08      Final result by Joni Oneil MD (08/25/23 11:22:08)                   Impression:      As above      Electronically signed by: Joni Oneil MD  Date:    08/25/2023  Time:    11:22               Narrative:    EXAMINATION:  XR TIBIA FIBULA 2 VIEW LEFT    CLINICAL HISTORY:  S/p ORIF;    TECHNIQUE:  AP and lateral views of the left tibia and fibula were performed.    COMPARISON:  None.    FINDINGS:  Bones appear demineralized.  Postoperative changes of ORIF are seen at the mid tibial shaft with a lateral plate and multiple screws in place.  Position and alignment appears satisfactory.  Hardware appears intact.  As seen on the lateral view, there is deformity of the mid shaft of the left fibula, compatible with an old healed fracture.  No definite evidence of acute fracture or dislocation.  Partially visualized changes of TKA are identified.  Degenerative changes at the tibiotalar joint.                                       X-Ray Chest 1 View Pre-OP (Final result)  Result time 08/25/23 10:39:03      Final result by Clarice  MD Joni (08/25/23 10:39:03)                   Impression:      No active cardiopulmonary disease and no significant interval change      Electronically signed by: Joni Oneil MD  Date:    08/25/2023  Time:    10:39               Narrative:    EXAMINATION:  XR CHEST 1 VIEW PRE-OP    CLINICAL HISTORY:  preop;    TECHNIQUE:  Single frontal view of the chest was performed.    COMPARISON:  01/04/2017    FINDINGS:  Monitoring leads and additional artifacts are projected over the thorax.  Heart size and pulmonary vascularity are within normal limits.  Mild tortuosity of the thoracic aorta.  Lungs are satisfactorily expanded and appear free of active disease.  No pleural fluid or pneumothorax.  Mild curvature of the thoracic spine.  Soft tissues and osseous structures are otherwise unremarkable.                                       X-Ray Hip 2 or 3 views Left (with Pelvis when performed) (Final result)  Result time 08/25/23 09:44:27      Final result by Azeem Price MD (08/25/23 09:44:27)                   Impression:      Please see discussion above.      Electronically signed by: Azeem Price  Date:    08/25/2023  Time:    09:44               Narrative:    EXAMINATION:  XR HIP WITH PELVIS WHEN PERFORMED, 2 OR 3 VIEWS LEFT    CLINICAL HISTORY:  Dislocation of previous arthoplasty;    TECHNIQUE:  AP view of the pelvis and frog leg lateral view of the left hip were performed.    COMPARISON:  None    FINDINGS:  Examination limited by suspected osseous demineralization and suboptimal patient positioning.    Status post bilateral total hip arthroplasty.    There is dislocation of the left hip hardware, with superior translation of the femoral component relative to the acetabular component.    Additionally, there appears to be lucency through the acetabular component of the hardware concerning hardware fracture.    Elsewhere, no definite additional fracture or dislocation hardware complication is seen.  No acute  findings in the visualized portions of the abdomen or pelvis.                                        X-Ray Femur Ap/Lat Left (Final result)  Result time 08/25/23 09:45:17      Final result by Joni Oneil MD (08/25/23 09:45:17)                   Impression:      As above    This report was flagged in Epic as abnormal.      Electronically signed by: Joni Oneil MD  Date:    08/25/2023  Time:    09:45               Narrative:    EXAMINATION:  XR FEMUR 2 VIEW LEFT    CLINICAL HISTORY:  Unspecified dislocation of left hip, initial encounter    TECHNIQUE:  AP and lateral views of the left femur were performed.    COMPARISON:  None}    FINDINGS:  Bones appear somewhat demineralized.  Postoperative changes of LENNOX are identified.  As best appreciated on the frontal view, the femoral component is dislocated superiorly and laterally with respect to the acetabular component.  At the proximal left femoral shaft, there is an apparent step-off at the lateral cortical margin.  It is difficult to completely exclude the possibility of a recent fracture in this area.  Comparison to any previous images which might be available would be most helpful.  If no old studies are available, cross-sectional imaging may be considered for further evaluation.  No other evidence of fracture or dislocation is seen.  Postoperative changes of left TKA are also noted.                                       X-Ray Knee 1 or 2 View Left (Final result)  Result time 08/25/23 09:28:59   Procedure changed from X-Ray Knee 3 View Left     Final result by Joni Oneil MD (08/25/23 09:28:59)                   Impression:      As above      Electronically signed by: Joni Oneil MD  Date:    08/25/2023  Time:    09:28               Narrative:    EXAMINATION:  XR KNEE 1 OR 2 VIEW LEFT    CLINICAL HISTORY:  hip dislocation, left; Unspecified dislocation of left hip, initial encounter    TECHNIQUE:  One or two views of the left knee were  performed.    COMPARISON:  04/21/2009    FINDINGS:  Interval left TKA.  Position and alignment of the prosthesis appears satisfactory.  Partially visualized left femoral prosthesis and left tibial ORIF.  Partially visualized deformity of the fibular shaft, likely representing an old healed fracture.  No acute fracture or dislocation is seen.                                       Medications   potassium chloride SA CR tablet 40 mEq (40 mEq Oral Not Given 8/25/23 1100)   sodium chloride 0.9% flush 3 mL (3 mLs Intravenous Not Given 8/25/23 1400)   ondansetron disintegrating tablet 8 mg (has no administration in time range)   ondansetron injection 4 mg (has no administration in time range)   acetaminophen tablet 650 mg (has no administration in time range)   HYDROcodone-acetaminophen 5-325 mg per tablet 1 tablet (has no administration in time range)   HYDROcodone-acetaminophen 7.5-325 mg per tablet 1 tablet (has no administration in time range)   HYDROcodone-acetaminophen  mg per tablet 1 tablet (has no administration in time range)   HYDROmorphone injection 0.5 mg (has no administration in time range)   hydrALAZINE tablet 25 mg (has no administration in time range)   losartan tablet 100 mg (has no administration in time range)   ergocalciferol capsule 50,000 Units (50,000 Units Oral Not Given 8/25/23 1215)   amLODIPine tablet 5 mg (5 mg Oral Not Given 8/25/23 1215)   morphine injection 4 mg (4 mg Intravenous Given 8/25/23 0804)   ondansetron injection 4 mg (4 mg Intravenous Given 8/25/23 0804)   lactated ringers bolus 500 mL (0 mLs Intravenous Stopped 8/25/23 1039)   potassium bicarbonate disintegrating tablet 40 mEq (40 mEq Oral Given 8/25/23 0914)   propofol (DIPRIVAN) 10 mg/mL IVP (20 mg Intravenous Given 8/25/23 1056)   ondansetron injection 4 mg (4 mg Intravenous Given 8/25/23 1050)     Medical Decision Making  Patient is a 72-year-old female that presents the emergency department via transfer from  [place] for orthopedic evaluation.  Patient does have obvious left leg shortening setting of previous hip replacement surgery.    Initial impression:  Patient is neurovascularly intact traumatic hip dislocation of her left hip which has previously been replaced and revised in the past.  Patient did have significant pain and nausea at the time of presentation to [facility].  Suspect that symptomatology is all related to left hip dislocation.    We will order preoperative labs including CBC, CMP, PT and INR as well as type and screening for the OR.  We will repeat x-rays as we do not have access to her imaging from [place].  Given history of revision with johnna placement we will obtain x-rays of left hip, left femur and left knee.    This was a nontraumatic dislocation.  The patient denies falling or hitting her head or anything of this etiology.  No need to scan any other part of her body.    Orthopedic came down and evaluated the patient in the emergency department.  We will to reduce the left hip dislocation under conscious sedation.  Patient was able to ambulate and tolerate oral intake after the sedation.    Will discharge home, patient has specific instructions to call her orthopedic surgeon back in [place] to schedule appointment now patient.    The patient also discharged with knee immobilizer to her name and dislocation of the hip the 2nd time    Amount and/or Complexity of Data Reviewed  External Data Reviewed: labs, radiology, ECG and notes.     Details: Reviewed transfer paperwork, we will compared to labs drawn here to monitor for trending.  Labs: ordered. Decision-making details documented in ED Course.  Radiology: ordered and independent interpretation performed. Decision-making details documented in ED Course.  ECG/medicine tests: ordered and independent interpretation performed. Decision-making details documented in ED Course.    Risk  Prescription drug management.  Parenteral controlled  substances.  Drug therapy requiring intensive monitoring for toxicity.               ED Course as of 08/25/23 1452   Fri Aug 25, 2023   0833 Hemoglobin(!): 16.3  Likely elevated in setting of dehydration [TK]   0834 Potassium(!): 3.3  We will replace with oral potassium [TK]   1003 X-Ray Femur Ap/Lat Left(!)  My own interpretation: Dislocation of previous revised hip replacement, diffuse bony demineralization  [TK]   1452 X-Ray Hip 2 or 3 views Left (with Pelvis when performed)  Worse post reduction x-ray demonstrates appropriately reduced left hip [TK]      ED Course User Index  [TK] Paul Dodson DO                    Clinical Impression:   Final diagnoses:  [S73.005A] Hip dislocation, left        ED Disposition Condition    Discharge Stable          ED Prescriptions       Medication Sig Dispense Start Date End Date Auth. Provider    ondansetron (ZOFRAN) 4 MG tablet Take 1 tablet (4 mg total) by mouth every 6 (six) hours. 20 tablet 8/25/2023 -- Paul Dodson DO          Follow-up Information       Follow up With Specialties Details Why Contact Info    Jacki Rivero MD Family Medicine Call in 2 days  [address] [facility] [id number] [phone]        Call in 2 days  Follow up with your Home town orthopedic surgeon. Call his clinic on monday             Paul Dodson DO  Resident  08/25/23 0811

## 2023-08-25 NOTE — Clinical Note
Diagnosis: Hip dislocation, left [[id number]]   Admitting Provider:: OLESYA ZURITA [[id number]]   Future Attending Provider: OLESYA ZURITA [[id number]]   Reason for IP Medical Treatment  (Clinical interventions that can only be accomplished in the IP setting? ) :: ORIF of hip dislocation   I certify that Inpatient services for greater than or equal to 2 midnights are medically necessary:: Yes   Plans for Post-Acute care--if anticipated (pick the single best option):: B. Discharge home with medical equipment
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
## 2023-08-25 NOTE — ED NOTES
Patient able to ambulate with stand by assistance around the room with knee immobilizer in place.

## 2023-08-25 NOTE — PHARMACY MED REC
"Admission Medication History     The home medication history was taken by Tracey Cervantes.    You may go to "Admission" then "Reconcile Home Medications" tabs to review and/or act upon these items.     The home medication list has been updated by the Pharmacy department.   Please read ALL comments highlighted in yellow.   Please address this information as you see fit.    Feel free to contact us if you have any questions or require assistance.      The medications listed below were removed from the home medication list. Please reorder if appropriate:  Patient reports no longer taking the following medication(s):  ALENDRONATE 70 MG  ERGOCALCIFEROL 50,000 UNITS  FOLIC ACID 1 MG  HYDROCHLOROTHIAZIDE 12.5 MG  HYDROCODONE-ACETAMINOPHEN 5-325 MG  METHOTREXATE 2.5 MG    Medications listed below were obtained from: Patient/family and Analytic software- [employer]  Current Outpatient Medications on File Prior to Encounter   Medication Sig    amLODIPine (NORVASC) 5 MG tablet Take 5 mg by mouth once daily.    aspirin (ECOTRIN) 81 MG EC tablet Take 81 mg by mouth once daily.    losartan (COZAAR) 50 MG tablet Take 50 mg by mouth once daily.             Tracey Cervantes  EXT [id number]                  .          "

## 2023-08-28 ENCOUNTER — TELEPHONE (OUTPATIENT)
Dept: ORTHOPEDICS | Facility: CLINIC | Age: 73
End: 2023-08-28
Payer: MEDICARE

## 2023-08-28 NOTE — TELEPHONE ENCOUNTER
----- Message from Don Hutchison sent at 8/28/2023  9:22 AM CDT -----  Regarding: return call  Contact: [phone]  Pt is returning a missed call from.ortho.. in regards to.appt states ended up getting appt in [place] so she no longer needs appt no notte left in epic with who called her ..... please call and adv @[phone]                  I called the patient back -it went straight to VOICE  MAIL  I left a message to call me back IF she needs an appointment  here at the [facility]  I left my name number & date & time of call

## 2023-08-28 NOTE — TELEPHONE ENCOUNTER
LV with name and call back number    ----- Message -----  From: Doug Kaye MD  Sent: 8/25/2023   1:50 PM CDT  To: [facility] Staff  Subject: fu hip dislocation                               Patient with hx of L revision LENNOX seen in the ED for prosthetic posterior hip dislocation. Reduced under conscious sedation. She has a broken head liner. She will need fu with Dr. Soriano as she no longer sees her other surgeon and would like to see someone here. Thanks!

## 2023-08-29 ENCOUNTER — TELEPHONE (OUTPATIENT)
Dept: ORTHOPEDICS | Facility: CLINIC | Age: 73
End: 2023-08-29
Payer: MEDICARE

## 2023-08-29 NOTE — TELEPHONE ENCOUNTER
Not able to contact pt, made appt and mailed letter.  ----- Message -----  From: Doug Kaye MD  Sent: 8/25/2023   1:50 PM CDT  To: [facility] Staff  Subject: fu hip dislocation                               Patient with hx of L revision LENNOX seen in the ED for prosthetic posterior hip dislocation. Reduced under conscious sedation. She has a broken head liner. She will need fu with Dr. Soriano as she no longer sees her other surgeon and would like to see someone here. Thanks!